# Patient Record
Sex: FEMALE | Race: BLACK OR AFRICAN AMERICAN | NOT HISPANIC OR LATINO | URBAN - METROPOLITAN AREA
[De-identification: names, ages, dates, MRNs, and addresses within clinical notes are randomized per-mention and may not be internally consistent; named-entity substitution may affect disease eponyms.]

---

## 2017-03-17 ENCOUNTER — EMERGENCY (EMERGENCY)
Facility: HOSPITAL | Age: 47
LOS: 1 days | Discharge: HOME | End: 2017-03-17
Attending: EMERGENCY MEDICINE
Payer: SELF-PAY

## 2017-03-17 VITALS
OXYGEN SATURATION: 98 % | RESPIRATION RATE: 16 BRPM | TEMPERATURE: 98 F | DIASTOLIC BLOOD PRESSURE: 78 MMHG | HEART RATE: 98 BPM | SYSTOLIC BLOOD PRESSURE: 125 MMHG | WEIGHT: 141.98 LBS | HEIGHT: 66 IN

## 2017-03-17 DIAGNOSIS — Z98.890 OTHER SPECIFIED POSTPROCEDURAL STATES: Chronic | ICD-10-CM
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
## 2017-03-17 DIAGNOSIS — M54.5 LOW BACK PAIN: ICD-10-CM

## 2017-03-17 DIAGNOSIS — S53.401A UNSPECIFIED SPRAIN OF RIGHT ELBOW, INITIAL ENCOUNTER: ICD-10-CM

## 2017-03-17 DIAGNOSIS — S63.501A UNSPECIFIED SPRAIN OF RIGHT WRIST, INITIAL ENCOUNTER: ICD-10-CM

## 2017-03-17 PROCEDURE — 73110 X-RAY EXAM OF WRIST: CPT | Mod: 26,RT

## 2017-03-17 PROCEDURE — 73080 X-RAY EXAM OF ELBOW: CPT | Mod: 26,76,LT

## 2017-03-17 PROCEDURE — 99284 EMERGENCY DEPT VISIT MOD MDM: CPT

## 2017-03-17 PROCEDURE — 73130 X-RAY EXAM OF HAND: CPT | Mod: 26,RT

## 2017-03-17 PROCEDURE — 73130 X-RAY EXAM OF HAND: CPT

## 2017-03-17 PROCEDURE — 73080 X-RAY EXAM OF ELBOW: CPT

## 2017-03-17 PROCEDURE — 73110 X-RAY EXAM OF WRIST: CPT

## 2017-03-17 RX ORDER — IBUPROFEN 200 MG
600 TABLET ORAL ONCE
Refills: 0 | Status: COMPLETED | OUTPATIENT
Start: 2017-03-17 | End: 2017-03-17

## 2017-03-17 RX ORDER — IBUPROFEN 200 MG
1 TABLET ORAL
Qty: 20 | Refills: 0
Start: 2017-03-17 | End: 2017-03-22

## 2017-03-17 RX ADMIN — Medication 600 MILLIGRAM(S): at 20:32

## 2017-03-17 RX ADMIN — Medication 600 MILLIGRAM(S): at 21:05
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
## 2017-03-17 NOTE — ED PROVIDER NOTE - MEDICAL DECISION MAKING DETAILS
Pt presents with b/l elbow pain (worse with R elbow), R wrist pain, and lower back pain s/p fall on R side. Pt denies head injury. Normal physical exam. Pt has pain to R elbow, R wrist, and R hand; back with muscle tenderness. Plan for X-Ray to r/o fractures, Ibuprofen for pain, and reassess. Pt presents with b/l elbow pain (worse with R elbow), R wrist pain, and lower back pain s/p fall on R side. Pt denies head injury. Normal physical exam. Pt has pain to R elbow, R wrist, and R hand; back with muscle tenderness. Plan for X-Ray to r/o fractures, Ibuprofen for pain, and DC out with velcro preformed wrist splint.

## 2017-03-17 NOTE — ED PROVIDER NOTE - SKIN, MLM
Skin normal color for race, warm, dry and intact. No evidence of rash. No swelling, no bruising, no deformity.

## 2017-03-17 NOTE — ED ADULT NURSE NOTE - DISCHARGE TEACHING
Right wrist placed in soft wrist brace. Able to move all fingers of right hand. Patient discharged with medications, follow up instructions. Ambulatory with steady gait. Patient left with coworker. In no acute distress.

## 2017-03-17 NOTE — ED PROVIDER NOTE - DIAGNOSTIC INTERPRETATION
R hand, wrist, elbow with no fracture, small anterior fat pad  L elbow, no fracture, small anterior fat pad

## 2017-03-17 NOTE — ED PROVIDER NOTE - CARE PLAN
Principal Discharge DX:	Elbow sprain, right, initial encounter  Secondary Diagnosis:	Fall  Secondary Diagnosis:	Wrist sprain, right, initial encounter

## 2018-06-21 NOTE — ED ADULT TRIAGE NOTE - ACCOMPANIED BY
Physical Therapy  Chart reviewed. Pt currently visiting with daughter and reports she has been up multiple times today. She declined walking at this time and wishing for PT to follow up tomorrow. Will f/u as able/appropriate. Thanks!   Yee Mattson, PT EMT/paramedic

## 2018-08-07 ENCOUNTER — EMERGENCY (EMERGENCY)
Facility: HOSPITAL | Age: 48
LOS: 1 days | Discharge: HOME | End: 2018-08-07
Attending: EMERGENCY MEDICINE
Payer: COMMERCIAL

## 2018-08-07 VITALS
HEART RATE: 67 BPM | DIASTOLIC BLOOD PRESSURE: 77 MMHG | RESPIRATION RATE: 16 BRPM | SYSTOLIC BLOOD PRESSURE: 117 MMHG | TEMPERATURE: 98 F | OXYGEN SATURATION: 100 %

## 2018-08-07 VITALS
HEART RATE: 72 BPM | SYSTOLIC BLOOD PRESSURE: 116 MMHG | RESPIRATION RATE: 16 BRPM | TEMPERATURE: 98 F | DIASTOLIC BLOOD PRESSURE: 72 MMHG | OXYGEN SATURATION: 100 %

## 2018-08-07 DIAGNOSIS — Z98.890 OTHER SPECIFIED POSTPROCEDURAL STATES: Chronic | ICD-10-CM

## 2018-08-07 PROCEDURE — 99283 EMERGENCY DEPT VISIT LOW MDM: CPT

## 2018-08-07 RX ORDER — IBUPROFEN 200 MG
600 TABLET ORAL ONCE
Qty: 0 | Refills: 0 | Status: DISCONTINUED | OUTPATIENT
Start: 2018-08-07 | End: 2018-08-07

## 2018-08-07 RX ORDER — IBUPROFEN 200 MG
600 TABLET ORAL ONCE
Qty: 0 | Refills: 0 | Status: COMPLETED | OUTPATIENT
Start: 2018-08-07 | End: 2018-08-07

## 2018-08-07 RX ADMIN — Medication 600 MILLIGRAM(S): at 11:50

## 2018-08-07 RX ADMIN — Medication 600 MILLIGRAM(S): at 11:20

## 2018-08-07 NOTE — ED PROVIDER NOTE - PLAN OF CARE
You may use Tylenol 650mg every 8 hours or Motrin 600mg every 8 hours as needed for pain.     You may use warm or hot packs on the affected areas as well.     Return for worsening pain, weakness of the arms or legs or any other concerns.
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
## 2018-08-07 NOTE — ED PROVIDER NOTE - PHYSICAL EXAMINATION
Attending MD Barron: A & O x 3, GCS 15, NAD, HEENT WNL and no facial asymmetry; no midline spinal TTP; lungs CTAB with no chest wall trauma or TTP, heart with reg rhythm without murmur; abdomen soft NTND with no R/G; extremities with no edema/deformities; skin with no rashes, neuro exam non focal with no motor or sensory deficits and patient is moving all extremities spontaneously.

## 2018-08-07 NOTE — ED PROVIDER NOTE - CARE PLAN
Principal Discharge DX:	Neck pain  Secondary Diagnosis:	Back pain Principal Discharge DX:	Neck pain  Assessment and plan of treatment:	You may use Tylenol 650mg every 8 hours or Motrin 600mg every 8 hours as needed for pain.     You may use warm or hot packs on the affected areas as well.     Return for worsening pain, weakness of the arms or legs or any other concerns.  Secondary Diagnosis:	Back pain

## 2018-08-07 NOTE — ED ADULT NURSE NOTE - CHPI ED NUR SYMPTOMS NEG
no difficulty bearing weight/no fussiness/no disorientation/no dizziness/no laceration/no bruising/no headache/no acting out behaviors/no decreased eating/drinking/no loss of consciousness/no sleeping issues/no crying

## 2018-08-07 NOTE — ED ADULT NURSE NOTE - NSIMPLEMENTINTERV_GEN_ALL_ED
Implemented All Universal Safety Interventions:  [place] to call system. Call bell, personal items and telephone within reach. Instruct patient to call for assistance. Room bathroom lighting operational. Non-slip footwear when patient is off stretcher. Physically safe environment: no spills, clutter or unnecessary equipment. Stretcher in lowest position, wheels locked, appropriate side rails in place.

## 2018-10-19 ENCOUNTER — EMERGENCY (EMERGENCY)
Facility: HOSPITAL | Age: 48
LOS: 1 days | Discharge: HOME | End: 2018-10-19
Attending: EMERGENCY MEDICINE
Payer: COMMERCIAL

## 2018-10-19 VITALS
WEIGHT: 139.99 LBS | HEART RATE: 70 BPM | RESPIRATION RATE: 18 BRPM | HEIGHT: 66 IN | SYSTOLIC BLOOD PRESSURE: 115 MMHG | TEMPERATURE: 98 F | OXYGEN SATURATION: 100 % | DIASTOLIC BLOOD PRESSURE: 76 MMHG

## 2018-10-19 VITALS
OXYGEN SATURATION: 100 % | RESPIRATION RATE: 16 BRPM | DIASTOLIC BLOOD PRESSURE: 79 MMHG | SYSTOLIC BLOOD PRESSURE: 118 MMHG | HEART RATE: 72 BPM | TEMPERATURE: 98 F

## 2018-10-19 DIAGNOSIS — Z98.890 OTHER SPECIFIED POSTPROCEDURAL STATES: Chronic | ICD-10-CM

## 2018-10-19 PROCEDURE — 99283 EMERGENCY DEPT VISIT LOW MDM: CPT | Mod: 25

## 2018-10-19 PROCEDURE — 99283 EMERGENCY DEPT VISIT LOW MDM: CPT

## 2018-10-19 PROCEDURE — 99053 MED SERV 10PM-8AM 24 HR FAC: CPT

## 2018-10-19 RX ORDER — IBUPROFEN 200 MG
600 TABLET ORAL ONCE
Qty: 0 | Refills: 0 | Status: COMPLETED | OUTPATIENT
Start: 2018-10-19 | End: 2018-10-19

## 2018-10-19 RX ADMIN — Medication 600 MILLIGRAM(S): at 03:07

## 2018-10-19 NOTE — ED PROVIDER NOTE - PHYSICAL EXAMINATION
GENERAL: NAD  HEAD:  NCAT  EYES: EOMI, PERRLA, conjunctiva and sclera clear  ENMT: mmm  NECK: Supple  CHEST/LUNG: CTAB  HEART: Regular rate and rhythm; No murmurs, rubs, or gallops  ABDOMEN: Soft, Nontender, Nondistended; Bowel sounds present  EXTREMITIES:  2+ Peripheral Pulses  LYMPH: No lymphadenopathy noted  SKIN: No rashes or lesions  NERVOUS SYSTEM:  Alert & Oriented X3, Good concentration; Motor Strength 5/5 B/L upper and lower extremities
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
## 2018-10-19 NOTE — ED ADULT NURSE NOTE - NSIMPLEMENTINTERV_GEN_ALL_ED
Implemented All Universal Safety Interventions:  [place] to call system. Call bell, personal items and telephone within reach. Instruct patient to call for assistance. Room bathroom lighting operational. Non-slip footwear when patient is off stretcher. Physically safe environment: no spills, clutter or unnecessary equipment. Stretcher in lowest position, wheels locked, appropriate side rails in place.
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
## 2018-10-19 NOTE — ED ADULT NURSE NOTE - CHPI ED NUR SYMPTOMS NEG
no difficulty bearing weight/no neck tenderness/no disorientation/no dizziness/no decreased eating/drinking/no acting out behaviors/no bruising/no crying/no headache/no loss of consciousness/no sleeping issues/no laceration
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
## 2018-10-19 NOTE — ED PROVIDER NOTE - NS ED ROS FT
CONSTITUTIONAL: No weakness, fevers or chills  EYES/ENT: No visual changes;  No vertigo or throat pain   NECK: No pain or stiffness  RESPIRATORY: No cough, wheezing, hemoptysis; No shortness of breath  CARDIOVASCULAR: No chest pain or palpitations  GASTROINTESTINAL: No abdominal or epigastric pain. No nausea, vomiting, or hematemesis; No diarrhea or constipation. No melena or hematochezia.  GENITOURINARY: No dysuria, frequency or hematuria  NEUROLOGICAL: No numbness or weakness  MSK: + L lower back pain above L buttock cheek  SKIN: No itching, burning, rashes, or lesions   All other review of systems is negative unless indicated above.

## 2018-10-19 NOTE — ED PROVIDER NOTE - NSFOLLOWUPINSTRUCTIONS_ED_ALL_ED_FT
Please take motrin as needed per label instructions.  You can also get an over-the-counter lidocaine patches, which can be used once per day.  Please follow up with your primary care provider as needed.

## 2019-06-07 ENCOUNTER — EMERGENCY (EMERGENCY)
Facility: HOSPITAL | Age: 49
LOS: 1 days | End: 2019-06-07
Attending: EMERGENCY MEDICINE
Payer: COMMERCIAL

## 2019-06-07 VITALS
DIASTOLIC BLOOD PRESSURE: 80 MMHG | HEART RATE: 67 BPM | RESPIRATION RATE: 17 BRPM | SYSTOLIC BLOOD PRESSURE: 124 MMHG | TEMPERATURE: 98 F | OXYGEN SATURATION: 100 %

## 2019-06-07 DIAGNOSIS — Z98.890 OTHER SPECIFIED POSTPROCEDURAL STATES: Chronic | ICD-10-CM

## 2019-06-07 PROCEDURE — 99284 EMERGENCY DEPT VISIT MOD MDM: CPT

## 2019-06-07 PROCEDURE — 99283 EMERGENCY DEPT VISIT LOW MDM: CPT

## 2019-06-07 PROCEDURE — 73060 X-RAY EXAM OF HUMERUS: CPT | Mod: 26,RT

## 2019-06-07 PROCEDURE — 73030 X-RAY EXAM OF SHOULDER: CPT

## 2019-06-07 PROCEDURE — 73060 X-RAY EXAM OF HUMERUS: CPT

## 2019-06-07 PROCEDURE — 73030 X-RAY EXAM OF SHOULDER: CPT | Mod: 26,RT

## 2019-06-07 RX ORDER — IBUPROFEN 200 MG
600 TABLET ORAL ONCE
Refills: 0 | Status: COMPLETED | OUTPATIENT
Start: 2019-06-07 | End: 2019-06-07

## 2019-06-07 RX ORDER — ACETAMINOPHEN 500 MG
650 TABLET ORAL ONCE
Refills: 0 | Status: COMPLETED | OUTPATIENT
Start: 2019-06-07 | End: 2019-06-07

## 2019-06-07 RX ADMIN — Medication 600 MILLIGRAM(S): at 12:55

## 2019-06-07 RX ADMIN — Medication 650 MILLIGRAM(S): at 12:55
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
## 2019-06-07 NOTE — ED PROVIDER NOTE - ATTENDING CONTRIBUTION TO CARE
48 y/o female with the above documented history and HPI who on exam appears well. VSs noted, head NC/AT, EOMsI, neck supple, breathing c/ ease, chest wall and clavicle s/ ttp, back s/ scapula ttp, extremities s/ asymmetry c/ LROM of R shoulder in nearly all directions secondary to pain s/ erythema, edema, ecchymosis, warmth or bony point ttp, skin s/ ecchymosis or laceration and neurologically completely intact. Appropriate screening studies obtained. Analgesia provided. Will follow her studies and treat/dispo accordingly.

## 2019-06-07 NOTE — ED PROVIDER NOTE - PHYSICAL EXAMINATION
Gen: NAD, AOx3  Head: NCAT  HEENT: PERRL, oral mucosa moist, normal conjunctiva  Lung: CTAB, no respiratory distress  CV: rrr, no murmurs, Normal perfusion  MSK: No edema, no visible deformities, no TTP of right shoulder.  Pain with flexion, extension, and abduction of right shoulder past about 60 degrees.  +Radial pulses bilaterally.   Neuro: No focal neurologic deficits, equal  strength and equal sensation bilateral upper extremities  Skin: No rash   Psych: normal affect

## 2019-06-07 NOTE — ED ADULT NURSE NOTE - NSIMPLEMENTINTERV_GEN_ALL_ED
Implemented All Universal Safety Interventions:  [place] to call system. Call bell, personal items and telephone within reach. Instruct patient to call for assistance. Room bathroom lighting operational. Non-slip footwear when patient is off stretcher. Physically safe environment: no spills, clutter or unnecessary equipment. Stretcher in lowest position, wheels locked, appropriate side rails in place.

## 2019-06-07 NOTE — ED PROVIDER NOTE - NSFOLLOWUPINSTRUCTIONS_ED_ALL_ED_FT
- Rest, ice, and elevate the extremity.   - Use the sling for comfort but remember to allow your should time out of the sling to perform gentle rang of motion exercises as discussed in the ER.   - You may take Aleve for pain as directed  - Please call to schedule an appointment with an orthopedist or sports medicine doctor for follow up - contact list attached

## 2022-04-13 ENCOUNTER — APPOINTMENT (OUTPATIENT)
Dept: NEUROSURGERY | Facility: CLINIC | Age: 52
End: 2022-04-13
Payer: COMMERCIAL

## 2022-04-13 VITALS
HEART RATE: 75 BPM | TEMPERATURE: 98.2 F | DIASTOLIC BLOOD PRESSURE: 78 MMHG | SYSTOLIC BLOOD PRESSURE: 116 MMHG | HEIGHT: 66 IN | OXYGEN SATURATION: 100 % | BODY MASS INDEX: 22.5 KG/M2 | WEIGHT: 140 LBS

## 2022-04-13 DIAGNOSIS — Z82.3 FAMILY HISTORY OF STROKE: ICD-10-CM

## 2022-04-13 PROCEDURE — 99204 OFFICE O/P NEW MOD 45 MIN: CPT

## 2022-04-13 NOTE — PHYSICAL EXAM
[Person] : oriented to person [Place] : oriented to place [Time] : oriented to time [Cranial Nerves Optic (II)] : visual acuity intact bilaterally,  pupils equal round and reactive to light [Cranial Nerves Oculomotor (III)] : extraocular motion intact [Cranial Nerves Trigeminal (V)] : facial sensation intact symmetrically [Cranial Nerves Vestibulocochlear (VIII)] : hearing was intact bilaterally [Cranial Nerves Facial (VII)] : face symmetrical [Cranial Nerves Glossopharyngeal (IX)] : tongue and palate midline [Cranial Nerves Accessory (XI - Cranial And Spinal)] : head turning and shoulder shrug symmetric [Cranial Nerves Hypoglossal (XII)] : there was no tongue deviation with protrusion [Motor Tone] : muscle tone was normal in all four extremities [Motor Strength] : muscle strength was normal in all four extremities [Abnormal Walk] : normal gait [Balance] : balance was intact

## 2022-04-13 NOTE — HISTORY OF PRESENT ILLNESS
[de-identified] : Milagros Carlin is a 51 year old female with no significant PMH who has been having on and off headaches in the past, saw neurologist Dr. Ping Hutchinson who ordered imaging and found to have left frontal lobe 1.2 x 2.3 cm AVM. She feels overall well, besides her occasional headaches she denies other symptoms of motor, sensory, speech, or visual abnormalities. \par \par PCP: Dr. Lexa El\par Neurologist: Dr. Ping Hutchinson

## 2022-04-13 NOTE — ASSESSMENT
[FreeTextEntry1] : Impression: 51F with no significant PMH p/w occasional headaches, MRI ordered by neurologist Dr. 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 Mother had history of stroke.\par \par Patient has mild headaches on and off that resolve on their own. Denies other symptoms of motor, sensory, speech, or visual abnormalities. \par Counseled patient on the natural history of AVMs. Discussed risk of AVM rupture (2.4% per year) and signs and symptoms of strokes, or seizures. Risk factors for AVM rupture include smoking, uncontrolled blood pressure.\par \par Recommend diagnostic cerebral angiogram as the initial step to evaluate characteristics of AVM. The risks, benefits, alternatives, complications and personnel associated with the procedure were discussed with the patient in great detail. She requests that we proceed. \par \par Treatment modality options depend on location of AVM, size of nidus, where the veins drain, and/or aneurysms associated with AVMs. These options include endovascular intervention with embolization, open surgical resection of AVM, and/or radiosurgery. WIll reevaluate treatment plan after diagnostic angiogram. \par \par \par Plan:\par Diagnostic cerebral angiogram 4/21\par Functional MRI brain at [facility] as AVM is located near speech areas of brain\par Present patient's case in [facility] academic conference\par

## 2022-04-19 ENCOUNTER — OUTPATIENT (OUTPATIENT)
Dept: OUTPATIENT SERVICES | Facility: HOSPITAL | Age: 52
LOS: 1 days | End: 2022-04-19
Payer: COMMERCIAL

## 2022-04-19 VITALS
WEIGHT: 139.99 LBS | SYSTOLIC BLOOD PRESSURE: 108 MMHG | OXYGEN SATURATION: 99 % | HEART RATE: 69 BPM | HEIGHT: 66.5 IN | TEMPERATURE: 99 F | RESPIRATION RATE: 18 BRPM | DIASTOLIC BLOOD PRESSURE: 74 MMHG

## 2022-04-19 DIAGNOSIS — Q28.2 ARTERIOVENOUS MALFORMATION OF CEREBRAL VESSELS: ICD-10-CM

## 2022-04-19 DIAGNOSIS — I67.1 CEREBRAL ANEURYSM, NONRUPTURED: ICD-10-CM

## 2022-04-19 DIAGNOSIS — Z98.890 OTHER SPECIFIED POSTPROCEDURAL STATES: Chronic | ICD-10-CM

## 2022-04-19 DIAGNOSIS — Q27.30 ARTERIOVENOUS MALFORMATION, SITE UNSPECIFIED: ICD-10-CM

## 2022-04-19 DIAGNOSIS — Z11.52 ENCOUNTER FOR SCREENING FOR COVID-19: ICD-10-CM

## 2022-04-19 DIAGNOSIS — Z01.818 ENCOUNTER FOR OTHER PREPROCEDURAL EXAMINATION: ICD-10-CM

## 2022-04-19 LAB
ANION GAP SERPL CALC-SCNC: 15 MMOL/L — SIGNIFICANT CHANGE UP (ref 5–17)
BLD GP AB SCN SERPL QL: NEGATIVE — SIGNIFICANT CHANGE UP
BUN SERPL-MCNC: 11 MG/DL — SIGNIFICANT CHANGE UP (ref 7–23)
CALCIUM SERPL-MCNC: 9.1 MG/DL — SIGNIFICANT CHANGE UP (ref 8.4–10.5)
CHLORIDE SERPL-SCNC: 105 MMOL/L — SIGNIFICANT CHANGE UP (ref 96–108)
CO2 SERPL-SCNC: 21 MMOL/L — LOW (ref 22–31)
CREAT SERPL-MCNC: 1.18 MG/DL — SIGNIFICANT CHANGE UP (ref 0.5–1.3)
EGFR: 56 ML/MIN/1.73M2 — LOW
GLUCOSE SERPL-MCNC: 87 MG/DL — SIGNIFICANT CHANGE UP (ref 70–99)
HCT VFR BLD CALC: 35.7 % — SIGNIFICANT CHANGE UP (ref 34.5–45)
HGB BLD-MCNC: 11 G/DL — LOW (ref 11.5–15.5)
MCHC RBC-ENTMCNC: 22.7 PG — LOW (ref 27–34)
MCHC RBC-ENTMCNC: 30.8 GM/DL — LOW (ref 32–36)
MCV RBC AUTO: 73.8 FL — LOW (ref 80–100)
NRBC # BLD: 0 /100 WBCS — SIGNIFICANT CHANGE UP (ref 0–0)
PLATELET # BLD AUTO: 278 K/UL — SIGNIFICANT CHANGE UP (ref 150–400)
POTASSIUM SERPL-MCNC: 3.9 MMOL/L — SIGNIFICANT CHANGE UP (ref 3.5–5.3)
POTASSIUM SERPL-SCNC: 3.9 MMOL/L — SIGNIFICANT CHANGE UP (ref 3.5–5.3)
RBC # BLD: 4.84 M/UL — SIGNIFICANT CHANGE UP (ref 3.8–5.2)
RBC # FLD: 16 % — HIGH (ref 10.3–14.5)
RH IG SCN BLD-IMP: NEGATIVE — SIGNIFICANT CHANGE UP
SARS-COV-2 RNA SPEC QL NAA+PROBE: SIGNIFICANT CHANGE UP
SODIUM SERPL-SCNC: 141 MMOL/L — SIGNIFICANT CHANGE UP (ref 135–145)
WBC # BLD: 8 K/UL — SIGNIFICANT CHANGE UP (ref 3.8–10.5)
WBC # FLD AUTO: 8 K/UL — SIGNIFICANT CHANGE UP (ref 3.8–10.5)

## 2022-04-19 PROCEDURE — U0005: CPT

## 2022-04-19 PROCEDURE — 80048 BASIC METABOLIC PNL TOTAL CA: CPT

## 2022-04-19 PROCEDURE — U0003: CPT

## 2022-04-19 PROCEDURE — 86900 BLOOD TYPING SEROLOGIC ABO: CPT

## 2022-04-19 PROCEDURE — 86901 BLOOD TYPING SEROLOGIC RH(D): CPT

## 2022-04-19 PROCEDURE — C9803: CPT

## 2022-04-19 PROCEDURE — G0463: CPT

## 2022-04-19 PROCEDURE — 85027 COMPLETE CBC AUTOMATED: CPT

## 2022-04-19 PROCEDURE — 86850 RBC ANTIBODY SCREEN: CPT

## 2022-04-19 NOTE — H&P PST ADULT - NSICDXFAMILYHX_GEN_ALL_CORE_FT
FAMILY HISTORY:  FH: cerebral aneurysm, maternal cousin, age 46    Father  Still living? Unknown  FH: asthma, Age at diagnosis: Age Unknown    Mother  Still living? Unknown  FH: dementia, Age at diagnosis: Age Unknown  FH: stroke, Age at diagnosis: Age Unknown

## 2022-04-19 NOTE — H&P PST ADULT - NSICDXPASTSURGICALHX_GEN_ALL_CORE_FT
PAST SURGICAL HISTORY:  H/O colonoscopy     H/O hand surgery right hand x 2: ligament tear repair (2014) and tendon repair (2014)    H/O ovarian cystectomy left

## 2022-04-19 NOTE — H&P PST ADULT - HISTORY OF PRESENT ILLNESS
50 yo female     Covid19+ on 1/13/22 -  headache/fatigue/fever/cough x 2 days. Had bronchitis; symptoms resolved 2/7/22.   Covid19 vaccination series completed.  Covid19 PCR completed at [facility] today. 50 yo female presents to PST prior to scheduled cerebral angiogram on 4/21/22 with Dr. Martinez. Pmhx includes asthmatic bronchitis (very rare inhaler use; only when has URI), Covid19+ on 1/13/22 -  headache/fatigue/fever/cough, symptoms completely resolved after 2 weeks of onset, uterine fibroids, cervical arthritis. C/o of intermittent headaches x 3 months. Denies imbalance, seizures, fevers, chills, chest pain, palpitations, SOB/COVINGTON, vertigo, syncope. Recently had imaging of the brain which revealed "AVM". Evaluated by Dr. Martinez and above procedure was recommended.      Covid19 vaccination series completed.  Covid19 PCR completed at [facility] today. 52 yo female presents to PST prior to scheduled cerebral angiogram on 4/21/22 with Dr. Martinez. Pmhx includes asthmatic bronchitis (very rare inhaler use; only when has URI), Covid19+ on 1/13/22 -  headache/fatigue/fever/cough, symptoms completely resolved after 2 weeks of onset, uterine fibroids, cervical arthritis. C/o of intermittent headaches x 3 months. Denies imbalance, speech abnormality, dysphagia, seizures, fevers, chills, chest pain, palpitations, SOB/COVINGTON, vertigo, syncope. Recently had imaging of the brain which revealed "AVM". Evaluated by Dr. Martinez and above procedure was recommended.      Covid19 vaccination series completed.  Covid19 PCR completed at [facility] today.

## 2022-04-19 NOTE — H&P PST ADULT - NSICDXPASTMEDICALHX_GEN_ALL_CORE_FT
PAST MEDICAL HISTORY:  2019 novel coronavirus disease (COVID-19) 1/2022    Asthmatic bronchitis triggered by URIs; never intubated or hospitalized    Uterine fibroid

## 2022-04-19 NOTE — H&P PST ADULT - NSANTHOSAYNRD_GEN_A_CORE
No. MAURA screening performed.  STOP BANG Legend: 0-2 = LOW Risk; 3-4 = INTERMEDIATE Risk; 5-8 = HIGH Risk

## 2022-04-19 NOTE — H&P PST ADULT - PROBLEM SELECTOR PLAN 1
Cerebral angiogram on 4/21/22 with Dr. Martinez.  Pre-op instructions given. Questions answered.  Covid19 PCR at [facility] performed today.

## 2022-04-19 NOTE — H&P PST ADULT - BP NONINVASIVE DIASTOLIC (MM HG)
Vascular Surgery Vascular Surgery Vascular Surgery Vascular Surgery Hospitalist Hospitalist Hospitalist Vascular Surgery Vascular Surgery Hospitalist 74

## 2022-04-21 ENCOUNTER — TRANSCRIPTION ENCOUNTER (OUTPATIENT)
Age: 52
End: 2022-04-21

## 2022-04-21 ENCOUNTER — APPOINTMENT (OUTPATIENT)
Dept: NEUROSURGERY | Facility: HOSPITAL | Age: 52
End: 2022-04-21

## 2022-04-21 ENCOUNTER — OUTPATIENT (OUTPATIENT)
Dept: OUTPATIENT SERVICES | Facility: HOSPITAL | Age: 52
LOS: 1 days | End: 2022-04-21
Payer: COMMERCIAL

## 2022-04-21 VITALS
DIASTOLIC BLOOD PRESSURE: 76 MMHG | SYSTOLIC BLOOD PRESSURE: 115 MMHG | RESPIRATION RATE: 15 BRPM | OXYGEN SATURATION: 100 % | HEART RATE: 70 BPM

## 2022-04-21 VITALS
HEIGHT: 66 IN | DIASTOLIC BLOOD PRESSURE: 75 MMHG | OXYGEN SATURATION: 100 % | RESPIRATION RATE: 18 BRPM | TEMPERATURE: 99 F | HEART RATE: 83 BPM | WEIGHT: 139.99 LBS | SYSTOLIC BLOOD PRESSURE: 119 MMHG

## 2022-04-21 DIAGNOSIS — Z98.890 OTHER SPECIFIED POSTPROCEDURAL STATES: Chronic | ICD-10-CM

## 2022-04-21 DIAGNOSIS — Q28.2 ARTERIOVENOUS MALFORMATION OF CEREBRAL VESSELS: ICD-10-CM

## 2022-04-21 PROCEDURE — 36226 PLACE CATH VERTEBRAL ART: CPT | Mod: RT

## 2022-04-21 PROCEDURE — 36227 PLACE CATH XTRNL CAROTID: CPT

## 2022-04-21 PROCEDURE — C1769: CPT

## 2022-04-21 PROCEDURE — 36226 PLACE CATH VERTEBRAL ART: CPT

## 2022-04-21 PROCEDURE — C1887: CPT

## 2022-04-21 PROCEDURE — 76377 3D RENDER W/INTRP POSTPROCES: CPT | Mod: 26

## 2022-04-21 PROCEDURE — 36224 PLACE CATH CAROTD ART: CPT

## 2022-04-21 PROCEDURE — 36224 PLACE CATH CAROTD ART: CPT | Mod: 50

## 2022-04-21 PROCEDURE — C1894: CPT

## 2022-04-21 RX ORDER — MONTELUKAST 4 MG/1
1 TABLET, CHEWABLE ORAL
Qty: 0 | Refills: 0 | DISCHARGE

## 2022-04-21 RX ORDER — ALBUTEROL 90 UG/1
2 AEROSOL, METERED ORAL
Qty: 0 | Refills: 0 | DISCHARGE

## 2022-04-21 NOTE — CHART NOTE - NSCHARTNOTEFT_GEN_A_CORE
Interventional Neuro Radiology  Pre-Procedure Note    This is a 52 yo female presents with Pmhx includes asthmatic bronchitis (very rare inhaler use; only when has URI), Covid19+ on 22 -  headache/fatigue/fever/cough, symptoms completely resolved after 2 weeks of onset, uterine fibroids, cervical arthritis. Patient c/o of intermittent headaches x 3 months. Denies imbalance, speech abnormality, dysphagia, seizures, fevers, chills, chest pain, palpitations, SOB/COVINGTON, vertigo, syncope. Recently had imaging of the brain which revealed "AVM". Patient presents today to neuro IR for selective cerebral angiography.       Neuro Exam: Awake and alert, oriented x3, fluent, normal naming and repetition, follows 3 step commands. Extraocular movements intact, no nystagmus, visual fields full, face symmetric, tongue midline. No drift, 5/5 power x 4 extremities. Normal sensation to LT. Normal finger-to-nose and rapid alternating movements.    PAST MEDICAL & SURGICAL HISTORY:  2019 novel coronavirus disease (COVID-19) 2022  Asthmatic bronchitis triggered by URIs; never intubated or hospitalized  Uterine fibroid  H/O ovarian cystectomy left  H/O hand surgery right hand x 2: ligament tear repair () and tendon repair (2014)  H/O colonoscopy    Social History:   Denies tobacco use    FAMILY HISTORY:  FH: dementia (Mother) age 89  FH: asthma (Father)  age 62  FH: stroke (Mother)  FH: cerebral aneurysm maternal cousin, age 46    Allergies:   dust (Rhinitis)  No Known Drug Allergies  Seasonal (Rhinitis)  shellfish (Angioedema)      Current Medications:   · 	montelukast 4 mg oral granule: Last Dose Taken:  , 1 each orally once a day  · 	Albuterol (Eqv-ProAir HFA) 90 mcg/inh inhalation aerosol: Last Dose Taken:  , 2 puff(s) inhaled every 6 hours, As Needed    Labs:                         11.0   8.00  )-----------( 278      ( 2022 20:05 )             35.7           141  |  105  |  11  ----------------------------<  87  3.9   |  21<L>  |  1.18    Ca    9.1      2022 20:05          HCG: negative     Blood Bank: 22  A  --  Negative      Assessment/Plan:   This is a 52yo female  presents with AVM. Patient presents to neuro-IR for selective cerebral angiography. Procedure/ risks/ benefits/ goals/ alternatives were explained. Risks include but are not limited to stroke/ vessel injury/ hemorrhage/ groin hematoma. All questions answered. Informed content obtained from patient. Consent placed in chart.    Siri Joseph PA-C  [phone]

## 2022-04-21 NOTE — ASU DISCHARGE PLAN (ADULT/PEDIATRIC) - CARE PROVIDER_API CALL
Roger Martinez (MD; MS)  Unallocated  [address] [facility], [address] [place] [id number]  Phone: [phone]  Fax: [phone]  Follow Up Time:

## 2022-04-21 NOTE — ASU DISCHARGE PLAN (ADULT/PEDIATRIC) - NS MD DC FALL RISK RISK
For information on Fall & Injury Prevention, visit: [email or web address] OR  [email or web address] OR  https://www.cdc.gov/steadi/patient.html

## 2022-04-21 NOTE — CHART NOTE - NSCHARTNOTEFT_GEN_A_CORE
Interventional Neuro- Radiology   Procedure Note    Procedure: Selective Cerebral Angiography   Pre- Procedure Diagnosis: AVM   Post- Procedure Diagnosis:    : Dr. Juan MD  Fellow: Dr. Waldron  Physician Assistant: Siri Joseph PA-C    RN: Piotr   Tech: Herrera     Anesthesia: Dr. Hilda MD (MAC)     I/Os:  Fluids:  Neff: DTV   Contrast:  Estimated Blood Loss: <10cc      Preliminary Report:  Under MAC, using a ___Fr short sheath to the right groin/ right radial artery examination of left vertebral artery/ left internal carotid artery/ left external carotid artery/ right vertebral artery/ right internal carotid artery/ right external carotid artery via selective cerebral angiography demonstrates ________. ( Official note to follow).    Patient tolerated procedure well, vital signs stable, hemodynamically stable, no change in neurological status compared to baseline. Results discussed with patient and their family. Groin sheath d/c'ed, manual compression held to hemostasis, no active bleeding, no hematoma, Vascade applied, quick clot and safeguard balloon dressing applied at _____h.  Patient transferred to IR recovery for further care/ monitoring. Interventional Neuro- Radiology   Procedure Note    Procedure: Selective Cerebral Angiography   Pre- Procedure Diagnosis: AVM   Post- Procedure Diagnosis: left frontal AVM     : Dr. Juan MD  Fellow: Dr. Waldron  Physician Assistant: Siri Joseph PA-C    RN: Piotr   Tech: Herrera     Anesthesia: Dr. Hilda MD (MAC)     I/Os:  Fluids: 400cc   Neff: DTV   Contrast: 110cc   Estimated Blood Loss: <10cc      Preliminary Report:  Under MAC, using a 5/4Fr short sheath to the right radial artery examination of left internal carotid artery/ left external carotid artery/ right vertebral artery/ right common carotid artery via selective cerebral angiography demonstrates . ( Official note to follow).    Patient tolerated procedure well, vital signs stable, hemodynamically stable, no change in neurological status compared to baseline. Results discussed with patient and their family. Radial sheath d/c'ed, no active bleeding, no hematoma, TR band applied at 1205h with 12cc of air .  Patient transferred to IR recovery for further care/ monitoring.    Siri Joseph PA-C  [phone]

## 2022-04-21 NOTE — ASU DISCHARGE PLAN (ADULT/PEDIATRIC) - NURSING INSTRUCTIONS
Please feel free to contact us at [phone] if any problems arise. After 6PM, Monday through Friday, on weekends and on holidays, please call [phone] and ask for the radiology resident on call to be paged.

## 2022-04-21 NOTE — ASU PATIENT PROFILE, ADULT - FALL HARM RISK - HARM RISK INTERVENTIONS

## 2022-04-28 DIAGNOSIS — Q28.2 ARTERIOVENOUS MALFORMATION OF CEREBRAL VESSELS: ICD-10-CM

## 2022-05-02 PROBLEM — J45.909 UNSPECIFIED ASTHMA, UNCOMPLICATED: Chronic | Status: ACTIVE | Noted: 2022-04-19

## 2022-05-02 PROBLEM — D25.9 LEIOMYOMA OF UTERUS, UNSPECIFIED: Chronic | Status: ACTIVE | Noted: 2022-04-19

## 2022-05-02 PROBLEM — U07.1 COVID-19: Chronic | Status: ACTIVE | Noted: 2022-04-19

## 2022-05-03 ENCOUNTER — APPOINTMENT (OUTPATIENT)
Dept: MRI IMAGING | Facility: HOSPITAL | Age: 52
End: 2022-05-03

## 2022-05-03 ENCOUNTER — OUTPATIENT (OUTPATIENT)
Dept: OUTPATIENT SERVICES | Facility: HOSPITAL | Age: 52
LOS: 1 days | End: 2022-05-03
Payer: COMMERCIAL

## 2022-05-03 DIAGNOSIS — Z98.890 OTHER SPECIFIED POSTPROCEDURAL STATES: Chronic | ICD-10-CM

## 2022-05-03 DIAGNOSIS — Q28.2 ARTERIOVENOUS MALFORMATION OF CEREBRAL VESSELS: ICD-10-CM

## 2022-05-03 PROCEDURE — 70555 FMRI BRAIN BY PHYS/PSYCH: CPT | Mod: 26

## 2022-05-03 PROCEDURE — 70555 FMRI BRAIN BY PHYS/PSYCH: CPT

## 2022-05-11 ENCOUNTER — APPOINTMENT (OUTPATIENT)
Dept: NEUROSURGERY | Facility: CLINIC | Age: 52
End: 2022-05-11
Payer: COMMERCIAL

## 2022-05-11 VITALS
TEMPERATURE: 98.4 F | OXYGEN SATURATION: 99 % | HEIGHT: 66 IN | BODY MASS INDEX: 22.5 KG/M2 | SYSTOLIC BLOOD PRESSURE: 116 MMHG | DIASTOLIC BLOOD PRESSURE: 74 MMHG | HEART RATE: 70 BPM | WEIGHT: 140 LBS

## 2022-05-11 PROCEDURE — 99214 OFFICE O/P EST MOD 30 MIN: CPT

## 2022-05-11 NOTE — HISTORY OF PRESENT ILLNESS
[FreeTextEntry1] : Milagros Carlin is a 52 year old female with no significant PMH who has been having on and off headaches in the past, saw neurologist Dr. Ping Hutchinson who ordered imaging and found to have left frontal lobe 1.2 x 2.3 cm AVM. She initially presented with occasional headaches. 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 On 5/3, she had a functional MRI brain done.\par \par Today, she continues to do well overall. Denies symptoms of motor, sensory, speech, or visual abnormalities.

## 2022-05-11 NOTE — ASSESSMENT
[FreeTextEntry1] : Impression: \par 52F with no significant PMH p/w occasional headaches, MRI ordered by neurologist Dr. Ping Hutchinson, found to have left frontal lobe 1.2 x 2.3 cm arteriovenous malformation s/p dx angio on 4/21 confirming Spetzler Eric grade 2 AVM within L frontal lobe, with arterial feeders arising from the anterior division of the left middle cerebral artery, and venous outflow predominantly inferiorly and posteriorly into the left sigmoid sinus, and to a lesser extent into the anterior superior sagittal sinus. Functional MRI brain done 5/3, speech tasks demonstrate preferential activation of the left frontal lobe although there is some right frontal activation with object naming. The activated speech areas appear superior and posterior to the AVM nidus. \par \par Patient continues to do clinically well overall. Denies symptoms of motor, sensory, speech, or visual abnormalities. \par Discussed the risks, benefits, complications of all treatment modality options including endovascular intervention with embolization, open surgical resection of AVM, and/or stereotactic radiosurgery with gamma knife. \par Favorable functional MRI shows if plan to treat AVM surgically, will be relatively safe with some risk of affect on language but minimal. Surgical resection of AVM is the more definitive treatment.\par \par Gamma knife consists of usually one time treatment with sufficient high focused one time dose, with minimizing dose to surrounding area. Takes time to work, shutting down the AVM over time. Given the small size and compact nidus, we estimate effectiveness would be around 80%. Risks of gamma knife include affecting speech areas surrounding the AVM, delayed rupture after treatment, and radiation necrosis. \par Risks of surgery/treatment for AVM outweigh the risks of leaving AVM alone and risk of AVM rupture/bleeding.  \par \par \par Plan:\par Patient would like time to think which route she would like to proceed with\par

## 2022-05-24 ENCOUNTER — OUTPATIENT (OUTPATIENT)
Dept: OUTPATIENT SERVICES | Facility: HOSPITAL | Age: 52
LOS: 1 days | Discharge: HOME | End: 2022-05-24
Payer: COMMERCIAL

## 2022-05-24 ENCOUNTER — APPOINTMENT (OUTPATIENT)
Dept: RADIATION ONCOLOGY | Facility: CLINIC | Age: 52
End: 2022-05-24
Payer: COMMERCIAL

## 2022-05-24 DIAGNOSIS — Z87.09 PERSONAL HISTORY OF OTHER DISEASES OF THE RESPIRATORY SYSTEM: ICD-10-CM

## 2022-05-24 DIAGNOSIS — Z98.890 OTHER SPECIFIED POSTPROCEDURAL STATES: Chronic | ICD-10-CM

## 2022-05-24 DIAGNOSIS — Z78.9 OTHER SPECIFIED HEALTH STATUS: ICD-10-CM

## 2022-05-24 PROCEDURE — 99205 OFFICE O/P NEW HI 60 MIN: CPT | Mod: 25,95

## 2022-05-24 PROCEDURE — 77263 THER RADIOLOGY TX PLNG CPLX: CPT

## 2022-05-28 NOTE — REVIEW OF SYSTEMS
[Negative] : Psychiatric [Fever] : no fever [Chills] : no chills [Night Sweats] : no night sweats [Eye Pain] : no eye pain [Red Eyes] : eyes not red [Dysphagia] : no dysphagia [Loss of Hearing] : no loss of hearing [Nosebleeds] : no nosebleeds [Shortness Of Breath] : no shortness of breath [Cough] : no cough [Abdominal Pain] : no abdominal pain [Vomiting] : no vomiting [Confused] : no confusion [Dizziness] : no dizziness [Fainting] : no fainting [Difficulty Walking] : no difficulty walking [de-identified] : denies headaches

## 2022-05-28 NOTE — HISTORY OF PRESENT ILLNESS
[Home] : at home, [unfilled] , at the time of the visit. [Medical Office: ([facility])___] : at the medical office located in  [Verbal consent obtained from patient] : the patient, [unfilled] [FreeTextEntry1] : Ms. Carlin presents today for consideration for radiation therapy. She is seen today through [facility] for which she provides verbal consent on 3/21/2021 at 3:21 PM\par \par Ms. Carlin underwent a brain RMI 3/28/2022 for investigation of headaches in the context of her cousin having an aneurysm.\par \par Brain MRI done 3/28/2022 showed an AVM within the inferior gyrus of the left frontal lobe fed by the superior branch of the left MCA, 1.2 X 1.. 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 no longer having headaches. denies nausea, vomiting, focal weakness, numbness, tingling.

## 2022-05-28 NOTE — VITALS
[Maximal Pain Intensity: 0/10] : 0/10 [Least Pain Intensity: 0/10] : 0/10 [90: Able to carry normal activity; minor signs or symptoms of disease.] : 90: Able to carry normal activity; minor signs or symptoms of disease.  [Date: ____________] : Patient's last distress assessment performed on [unfilled]. [5 - Distress Level] : Distress Level: 5 [FreeTextEntry7] : social work offered patient refused

## 2022-06-03 ENCOUNTER — NON-APPOINTMENT (OUTPATIENT)
Age: 52
End: 2022-06-03

## 2022-06-30 ENCOUNTER — OUTPATIENT (OUTPATIENT)
Dept: OUTPATIENT SERVICES | Facility: HOSPITAL | Age: 52
LOS: 1 days | End: 2022-06-30
Payer: COMMERCIAL

## 2022-06-30 ENCOUNTER — APPOINTMENT (OUTPATIENT)
Dept: MRI IMAGING | Facility: IMAGING CENTER | Age: 52
End: 2022-06-30

## 2022-06-30 ENCOUNTER — APPOINTMENT (OUTPATIENT)
Dept: NEUROSURGERY | Facility: HOSPITAL | Age: 52
End: 2022-06-30

## 2022-06-30 DIAGNOSIS — Z98.890 OTHER SPECIFIED POSTPROCEDURAL STATES: Chronic | ICD-10-CM

## 2022-06-30 DIAGNOSIS — Q28.2 ARTERIOVENOUS MALFORMATION OF CEREBRAL VESSELS: ICD-10-CM

## 2022-06-30 PROCEDURE — 70546 MR ANGIOGRAPH HEAD W/O&W/DYE: CPT | Mod: 26

## 2022-06-30 PROCEDURE — 77300 RADIATION THERAPY DOSE PLAN: CPT | Mod: 26

## 2022-06-30 PROCEDURE — 76498 UNLISTED MR PROCEDURE: CPT

## 2022-06-30 PROCEDURE — 77295 3-D RADIOTHERAPY PLAN: CPT | Mod: 26

## 2022-06-30 PROCEDURE — 70546 MR ANGIOGRAPH HEAD W/O&W/DYE: CPT

## 2022-06-30 PROCEDURE — 61798 SRS CRANIAL LESION COMPLEX: CPT

## 2022-06-30 PROCEDURE — A9585: CPT

## 2022-06-30 PROCEDURE — 77432 STEREOTACTIC RADIATION TRMT: CPT

## 2022-06-30 PROCEDURE — 61800 APPLY SRS HEADFRAME ADD-ON: CPT

## 2022-06-30 RX ORDER — ACETAMINOPHEN 10 MG/ML
1000 INJECTION INTRAVENOUS
Qty: 0 | Refills: 0 | Status: COMPLETED | OUTPATIENT
Start: 2022-06-29

## 2022-06-30 RX ORDER — LORAZEPAM 0.5 MG/1
0.5 TABLET ORAL
Qty: 0 | Refills: 0 | Status: COMPLETED | OUTPATIENT
Start: 2022-06-29

## 2022-06-30 RX ORDER — IBUPROFEN 400 MG/1
400 TABLET, FILM COATED ORAL
Qty: 0 | Refills: 0 | Status: COMPLETED | OUTPATIENT
Start: 2022-06-29

## 2022-07-13 ENCOUNTER — APPOINTMENT (OUTPATIENT)
Dept: NEUROSURGERY | Facility: CLINIC | Age: 52
End: 2022-07-13

## 2022-07-13 VITALS
DIASTOLIC BLOOD PRESSURE: 76 MMHG | SYSTOLIC BLOOD PRESSURE: 128 MMHG | BODY MASS INDEX: 23.3 KG/M2 | OXYGEN SATURATION: 99 % | WEIGHT: 145 LBS | HEART RATE: 84 BPM | HEIGHT: 66 IN

## 2022-07-13 PROCEDURE — 99213 OFFICE O/P EST LOW 20 MIN: CPT

## 2022-07-13 NOTE — ASSESSMENT
[FreeTextEntry1] : Impression: \par 52F with no significant PMH p/w occasional headaches, MRI ordered by neurologist Dr. Ping Hutchinson, found to have left frontal lobe 1.2 x 2.3 cm arteriovenous malformation s/p dx angio on 4/21 confirming Spetzler Eric grade 2 AVM within L frontal lobe, with arterial feeders arising from the anterior division of the left middle cerebral artery, and venous outflow predominantly inferiorly and posteriorly into the left sigmoid sinus, and to a lesser extent into the anterior superior sagittal sinus. Functional MRI brain done 5/3, speech tasks demonstrate preferential activation of the left frontal lobe although there is some right frontal activation with object naming. The activated speech areas appear superior and posterior to the AVM nidus. s/p gamma knife radiosurgery on 6/30, 19Gy. On 12 day-2 week course of dex taper.\par \par On her first post-op visit, she is doing clinically well. Reports mild occasional headaches and tightness. Denies speech impairments or other symptoms of motor, sensory, speech, or visual abnormalities. \par \par \par Plan:\par Patient prefers waiting 1 full month post-gamma knife radiosurgery before returning to work\par Follow up in office in 2 weeks\par MRI brain w/wo in 6 months\par Repeat angiogram 1-3 years \par \par \par

## 2022-07-13 NOTE — HISTORY OF PRESENT ILLNESS
[FreeTextEntry1] : Milagros Carlin is a 52 year old female with no significant PMH who presented with who has been having on and off headaches in the past, saw neurologist Dr. Ping Hutchinson who ordered imaging and found to have left frontal lobe 1.2 x 2.3 cm AVM. She initially presented with occasional headaches. 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 On 5/3, she had a functional MRI brain done. On 6/30/2022, she underwent gamma knife radiosurgery with a frame, 19Gy. She was put on 2 week course of low dose dexamethasone. \par \par Today, she is doing overall well. Has occasional mild headaches and tightness primarily in th eback of her head, but being medically managed to OTC Tylenol. Denies issues with speech, and other symptoms of motor, sensory, speech, or visual abnormalities. \par Has a couple doses left of dex taper\par \par

## 2022-07-20 NOTE — ED ADULT TRIAGE NOTE - ARRIVAL FROM
Patient had 6-beat run of v-tach patient sitting on side of bed using urinal. Denies chest pain.     Patient lying in bed. Patient heart rate now fluctuating from 90s-140s.    R1 Labor Note    S: Patient evaluated at bedside for cervical change due to increasing pressure.     O:  T(C): 36.6 (20 @ 23:17), Max: 37.2 (20 @ 12:00)  HR: 86 (20 @ 01:20) (43 - 130)  BP: 105/73 (20 @ 01:20) (90/54 - 174/115)  RR: 16 (20 @ 05:40) (16 - 16)  SpO2: 92% (20 @ 01:19) (82% - 100%)    SVE: 10/100/+1  EFM: 135bpm, mod ofelia, +accel, -decels  [place]:  [id number]      A/P 30y P0 @39.2wks IOL A1. Will start pushing when patient feels contractions.   -IOL: on pitocin since 845a, IUPC in place.  -Cat 1 tracing  -GBS neg  -EFW 3200g  -COVID pend  -Analgesia epi in place  -Anticipate     d/w Dr. Frenkel Robyn Frankel PGY-2 Work

## 2022-07-27 ENCOUNTER — NON-APPOINTMENT (OUTPATIENT)
Age: 52
End: 2022-07-27

## 2022-07-27 ENCOUNTER — APPOINTMENT (OUTPATIENT)
Dept: NEUROSURGERY | Facility: CLINIC | Age: 52
End: 2022-07-27

## 2022-07-27 VITALS
OXYGEN SATURATION: 98 % | WEIGHT: 145 LBS | DIASTOLIC BLOOD PRESSURE: 72 MMHG | SYSTOLIC BLOOD PRESSURE: 114 MMHG | HEIGHT: 66 IN | TEMPERATURE: 98.4 F | BODY MASS INDEX: 23.3 KG/M2 | HEART RATE: 91 BPM

## 2022-07-27 PROCEDURE — 99024 POSTOP FOLLOW-UP VISIT: CPT

## 2022-07-27 NOTE — ASSESSMENT
[FreeTextEntry1] : Impression: \par 52F with no significant PMH p/w occasional headaches, MRI ordered by neurologist Dr. Ping Hutchinson, found to have left frontal lobe 1.2 x 2.3 cm arteriovenous malformation s/p dx angio on 4/21 confirming Spetzler Eric grade 2 AVM within L frontal lobe, with arterial feeders arising from the anterior division of the left middle cerebral artery, and venous outflow predominantly inferiorly and posteriorly into the left sigmoid sinus, and to a lesser extent into the anterior superior sagittal sinus. Functional MRI brain done 5/3, speech tasks demonstrate preferential activation of the left frontal lobe although there is some right frontal activation with object naming. The activated speech areas appear superior and posterior to the AVM nidus. s/p gamma knife radiosurgery on 6/30, 19Gy. \par \par On her second post-op visit, she is doing clinically well. Denies numbness, tightness, headaches, speech impairments or other symptoms of motor, or sensory abnormalities. States she has intermittent blurry vision, denies double vision. Assured this is likely unrelated due to the location of the AVM. \par \par \par Plan:\par Ok for patient to return to work from a neurosurgical standpoint\par MRI brain w/wo in 6 months - December 2022\par Repeat angiogram 1-3 years \par \par \par

## 2022-07-27 NOTE — HISTORY OF PRESENT ILLNESS
[FreeTextEntry1] : Milagros Carlin is a 52 year old female with no significant PMH who presented with who has been having on and off headaches in the past, saw neurologist Dr. Ping Hutchinson who ordered imaging and found to have left frontal lobe 1.2 x 2.3 cm AVM. She initially presented with occasional headaches. 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 On 5/3, she had a functional MRI brain done. On 6/30/2022, she underwent gamma knife radiosurgery with a frame, 19Gy. She was put on 2 week course of low dose dexamethasone. \par \par Today, she is doing overall well. States her tightness, numbness primarily in the back of her head has resolved. Denies issues with speech, and other symptoms of motor or sensory abnormalities. FInished course of dex taper.\par \par

## 2022-09-12 ENCOUNTER — NON-APPOINTMENT (OUTPATIENT)
Age: 52
End: 2022-09-12

## 2022-09-12 ENCOUNTER — APPOINTMENT (OUTPATIENT)
Dept: RADIATION ONCOLOGY | Facility: CLINIC | Age: 52
End: 2022-09-12

## 2022-09-26 ENCOUNTER — APPOINTMENT (OUTPATIENT)
Dept: RADIATION ONCOLOGY | Facility: CLINIC | Age: 52
End: 2022-09-26

## 2022-09-26 VITALS
TEMPERATURE: 97.52 F | DIASTOLIC BLOOD PRESSURE: 73 MMHG | RESPIRATION RATE: 17 BRPM | HEART RATE: 73 BPM | SYSTOLIC BLOOD PRESSURE: 112 MMHG | WEIGHT: 148.99 LBS | BODY MASS INDEX: 24.05 KG/M2 | OXYGEN SATURATION: 100 %

## 2022-09-26 PROCEDURE — 99024 POSTOP FOLLOW-UP VISIT: CPT

## 2022-09-26 NOTE — VITALS
[Least Pain Intensity: 0/10] : 0/10 [90: Able to carry normal activity; minor signs or symptoms of disease.] : 90: Able to carry normal activity; minor signs or symptoms of disease.  [Date: ____________] : Patient's last distress assessment performed on [unfilled]. [5 - Distress Level] : Distress Level: 5 [Maximal Pain Intensity: 6/10] : 6/10 [FreeTextEntry7] : social work offered patient refused

## 2022-09-26 NOTE — PHYSICAL EXAM
[General Appearance - Well Developed] : well developed [Normal] : oriented to person, place and time, the affect was normal, the mood was normal and not anxious

## 2022-09-26 NOTE — HISTORY OF PRESENT ILLNESS
[FreeTextEntry1] : Ms. Carlin presented initially on 5/24/2022 for consideration for radiation therapy.\par She completed gamma knife radiation therapy on 6/30/2022 for a total of 1900 cgy to a left frontal AVM.\par \par RELEVANT MEDICAL HISTORY\par Ms. Carlin underwent a brain MRI 3/28/2022 for investigation of headaches in the context of her cousin having an aneurysm.\par \par Brain MRI done 3/28/2022 showed an AVM within the inferior gyrus of the left frontal lobe fed by the superior branch of the left MCA, 1.2 X 1.. 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 denied nausea, vomiting, focal weakness, numbness, tingling. \par \par 9/26/2022- Ms. Carlin presents today for PTE. feeling well. has headaches for which she follows with a neurologist. had slight nausea after treatment, now resolved. had a few episodes of "waterfalls" in her peripheral vision after treatment, no longer having these.\par Once in a while has a left sided pressure, not severe. has not had a bad headache in a long time. notes she tries to control her stress as best as possible to avoid headaches.

## 2022-09-26 NOTE — REVIEW OF SYSTEMS
[Negative] : Respiratory [Urinary Frequency] : urinary frequency [Fever] : no fever [Chills] : no chills [Night Sweats] : no night sweats [Eye Pain] : no eye pain [Red Eyes] : eyes not red [Dysphagia] : no dysphagia [Loss of Hearing] : no loss of hearing [Nosebleeds] : no nosebleeds [Shortness Of Breath] : no shortness of breath [Cough] : no cough [Abdominal Pain] : no abdominal pain [Vomiting] : no vomiting [Confused] : no confusion [Dizziness] : no dizziness [Fainting] : no fainting [Difficulty Walking] : no difficulty walking [FreeTextEntry3] : had a few episodes of noting "waterfalls" in peripheral vision after radiation, but has not had those in a long time [FreeTextEntry7] : had some nausea after treatment, no longer with nausea or vomiting [FreeTextEntry9] : denies focal weakness [de-identified] : had some darkening of hand skin, no longer [de-identified] : still with headaches, which come and go. has not had a severed headache in a long time.

## 2023-01-09 ENCOUNTER — APPOINTMENT (OUTPATIENT)
Dept: MRI IMAGING | Facility: CLINIC | Age: 53
End: 2023-01-09
Payer: COMMERCIAL

## 2023-01-09 ENCOUNTER — OUTPATIENT (OUTPATIENT)
Dept: OUTPATIENT SERVICES | Facility: HOSPITAL | Age: 53
LOS: 1 days | End: 2023-01-09
Payer: COMMERCIAL

## 2023-01-09 DIAGNOSIS — Z98.890 OTHER SPECIFIED POSTPROCEDURAL STATES: Chronic | ICD-10-CM

## 2023-01-09 DIAGNOSIS — Z00.00 ENCOUNTER FOR GENERAL ADULT MEDICAL EXAMINATION WITHOUT ABNORMAL FINDINGS: ICD-10-CM

## 2023-01-09 PROCEDURE — 70545 MR ANGIOGRAPHY HEAD W/DYE: CPT | Mod: 26,59

## 2023-01-09 PROCEDURE — 70553 MRI BRAIN STEM W/O & W/DYE: CPT | Mod: 26

## 2023-01-09 PROCEDURE — 70545 MR ANGIOGRAPHY HEAD W/DYE: CPT

## 2023-01-09 PROCEDURE — A9585: CPT

## 2023-01-09 PROCEDURE — 70553 MRI BRAIN STEM W/O & W/DYE: CPT

## 2023-01-10 ENCOUNTER — TRANSCRIPTION ENCOUNTER (OUTPATIENT)
Age: 53
End: 2023-01-10

## 2023-01-18 ENCOUNTER — NON-APPOINTMENT (OUTPATIENT)
Age: 53
End: 2023-01-18

## 2023-01-18 ENCOUNTER — APPOINTMENT (OUTPATIENT)
Dept: NEUROSURGERY | Facility: CLINIC | Age: 53
End: 2023-01-18
Payer: COMMERCIAL

## 2023-01-18 VITALS
TEMPERATURE: 208.58 F | OXYGEN SATURATION: 99 % | DIASTOLIC BLOOD PRESSURE: 61 MMHG | HEART RATE: 79 BPM | BODY MASS INDEX: 23.3 KG/M2 | SYSTOLIC BLOOD PRESSURE: 95 MMHG | WEIGHT: 145 LBS | HEIGHT: 66 IN

## 2023-01-18 PROCEDURE — 99214 OFFICE O/P EST MOD 30 MIN: CPT

## 2023-01-18 RX ORDER — LORAZEPAM 0.5 MG/1
0.5 TABLET ORAL
Qty: 2 | Refills: 0 | Status: DISCONTINUED | COMMUNITY
Start: 2023-01-03 | End: 2023-01-18

## 2023-01-19 NOTE — HISTORY OF PRESENT ILLNESS
[FreeTextEntry1] : BC SOLIMAN is a 52 year old female with no significant PMH who presented with who has been having on and off headaches in the past, saw neurologist Dr. Ping Hutchinson who ordered imaging and found to have left frontal lobe 1.2 x 2.3 cm AVM. She initially presented with occasional headaches. 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 On 5/3, she had a functional MRI brain done. On 6/30/2022, she underwent gamma knife radiosurgery with a frame, 19Gy. She was put on 2 week course of low dose dexamethasone. \par \par Today, she is doing overall well. Reports mild occasional head pressure but not too bothersome. Denies issues with speech, and other symptoms of motor or sensory abnormalities. She underwent repeat brain imaging with MRI and MRA and presents for a follow up visit after imaging. \par

## 2023-01-19 NOTE — DATA REVIEWED
[de-identified] : MRI brain w/wo IV contrast 1/9/23 [de-identified] : MRA brain w/ IV contrast 1/9/23

## 2023-01-19 NOTE — ASSESSMENT
[FreeTextEntry1] : IMPRESSION:\par 52F with no significant PMH p/w occasional headaches, MRI ordered by neurologist Dr. Ping Hutchinson, found to have left frontal lobe 1.2 x 2.3 cm arteriovenous malformation s/p dx angio on 4/21 confirming Spetzler Eric grade 2 AVM within L frontal lobe, with arterial feeders arising from the anterior division of the left middle cerebral artery, and venous outflow predominantly inferiorly and posteriorly into the left sigmoid sinus, and to a lesser extent into the anterior superior sagittal sinus. Functional MRI brain done 5/3, speech tasks demonstrate preferential activation of the left frontal lobe although there is some right frontal activation with object naming. The activated speech areas appear superior and posterior to the AVM nidus. s/p gamma knife radiosurgery on 6/30, 19Gy in one treatment. \par \par Patient is doing clinically well. Reports mild occasional headaches. Denies numbness, tightness, speech impairments or other symptoms of motor, sensory, or visual abnormalities. \par \par MRI/A brain 1/9/23 shows mild decrease in size of left frontal AVM, measuring 1.5cm x 1.7cm, previously 1.7cm x 2.0cm. No adverse interval change. Minimal edema surrounding it and no signs of hemorrhage. \par \par Explained to patient that AVM treatment with gamma knife can take up to 3 years sometimes to resolve. We use the MRIs periodically to check in to make sure radiation necrosis or significant edema is not developing. Repeat angiography will be done within the next few years once it appears it is going away on MRI. \par \par \par PLAN:\par MRI brain w/wo IV contrast, MRA brain w/ IV contrast 1 year post-treatment - end of June/early July 2023\par Follow up after to discuss results\par Pre-MRI anti-anxiety medication ordered per patient's request\par Repeat cerebral angiogram, which will definitively confirm AVM obliteration, in the next few years once there are signs of resolution on MRI\par Plan to fax over note to neurologist, Dr. Ping Hutchinson, as requested by patient (fax: [phone])\par

## 2023-03-02 ENCOUNTER — APPOINTMENT (OUTPATIENT)
Dept: CARDIOLOGY | Facility: CLINIC | Age: 53
End: 2023-03-02
Payer: COMMERCIAL

## 2023-03-02 ENCOUNTER — NON-APPOINTMENT (OUTPATIENT)
Age: 53
End: 2023-03-02

## 2023-03-02 VITALS
HEIGHT: 66 IN | SYSTOLIC BLOOD PRESSURE: 104 MMHG | HEART RATE: 75 BPM | BODY MASS INDEX: 23.3 KG/M2 | OXYGEN SATURATION: 98 % | WEIGHT: 145 LBS | DIASTOLIC BLOOD PRESSURE: 70 MMHG

## 2023-03-02 PROCEDURE — 99203 OFFICE O/P NEW LOW 30 MIN: CPT | Mod: 25

## 2023-03-02 PROCEDURE — 93000 ELECTROCARDIOGRAM COMPLETE: CPT

## 2023-03-02 RX ORDER — ALPRAZOLAM 0.25 MG/1
0.25 TABLET ORAL
Qty: 2 | Refills: 0 | Status: DISCONTINUED | COMMUNITY
Start: 2023-01-18 | End: 2023-03-02

## 2023-03-02 NOTE — DISCUSSION/SUMMARY
[___ Week(s)] : in [unfilled] week(s) [FreeTextEntry3] : Echocardiogram, regular stress test, 6-month follow-up visit. [FreeTextEntry1] : I have ordered an echocardiogram to assess LV function and valvular status.  I will order a regular treadmill stress test to assess cardiac fitness and rule out any provocable ECG changes as well as check vital assessment to exercise.  I recommended a heart healthy lifestyle including a low-saturated fat, low cholesterol diet with improved aerobic physical fitness over time for cardiovascular benefits.  We will call the patient with test results and followup with you for general care.  Otherwise see me in 6 months or sooner if needed.

## 2023-03-02 NOTE — HISTORY OF PRESENT ILLNESS
[FreeTextEntry1] : She is a pleasant 52-year-old female with history of brain AVM treated with gamma knife therapy and her AVM has improved in size with less headaches, comes to our attention for cardiac work-up.  She denies history of dyslipidemia, diabetes and is a non-smoker.  No premature CAD in an immediate family member.  She has been experiencing left-sided chest discomfort that short-lived not directly effort related.

## 2023-03-09 ENCOUNTER — APPOINTMENT (OUTPATIENT)
Dept: CARDIOLOGY | Facility: CLINIC | Age: 53
End: 2023-03-09
Payer: COMMERCIAL

## 2023-03-09 PROCEDURE — 93306 TTE W/DOPPLER COMPLETE: CPT

## 2023-03-10 ENCOUNTER — APPOINTMENT (OUTPATIENT)
Dept: INTERNAL MEDICINE | Facility: CLINIC | Age: 53
End: 2023-03-10
Payer: COMMERCIAL

## 2023-03-10 VITALS
HEIGHT: 66 IN | WEIGHT: 150 LBS | DIASTOLIC BLOOD PRESSURE: 64 MMHG | SYSTOLIC BLOOD PRESSURE: 116 MMHG | HEART RATE: 88 BPM | RESPIRATION RATE: 16 BRPM | BODY MASS INDEX: 24.11 KG/M2 | OXYGEN SATURATION: 98 %

## 2023-03-10 DIAGNOSIS — Z12.11 ENCOUNTER FOR SCREENING FOR MALIGNANT NEOPLASM OF COLON: ICD-10-CM

## 2023-03-10 DIAGNOSIS — Z13.1 ENCOUNTER FOR SCREENING FOR DIABETES MELLITUS: ICD-10-CM

## 2023-03-10 DIAGNOSIS — Z13.21 ENCOUNTER FOR SCREENING FOR NUTRITIONAL DISORDER: ICD-10-CM

## 2023-03-10 DIAGNOSIS — Z13.0 ENCOUNTER FOR SCREENING FOR DISEASES OF THE BLOOD AND BLOOD-FORMING ORGANS AND CERTAIN DISORDERS INVOLVING THE IMMUNE MECHANISM: ICD-10-CM

## 2023-03-10 DIAGNOSIS — Z13.220 ENCOUNTER FOR SCREENING FOR LIPOID DISORDERS: ICD-10-CM

## 2023-03-10 DIAGNOSIS — Z12.39 ENCOUNTER FOR OTHER SCREENING FOR MALIGNANT NEOPLASM OF BREAST: ICD-10-CM

## 2023-03-10 DIAGNOSIS — Z12.4 ENCOUNTER FOR SCREENING FOR MALIGNANT NEOPLASM OF CERVIX: ICD-10-CM

## 2023-03-10 DIAGNOSIS — Z13.29 ENCOUNTER FOR SCREENING FOR OTHER SUSPECTED ENDOCRINE DISORDER: ICD-10-CM

## 2023-03-10 PROCEDURE — 99203 OFFICE O/P NEW LOW 30 MIN: CPT

## 2023-03-10 NOTE — PLAN
[FreeTextEntry1] : We will do routine blood work in the form of CBC, CMP, A1c, lipid, TSH, B12 folate at this point.\par she may follow-up earlier if needed\par Pt was told to call with problems or concerns. The patient was told to seek immediate attention by calling 911 or going to the ER if her condition does not improve or gets acutely worse.\par

## 2023-03-10 NOTE — HISTORY OF PRESENT ILLNESS
[FreeTextEntry1] : 52-year-old female is here for checkup. [de-identified] : 52-year-old female is here for checkup.  This is my first time seeing this patient.\par \par Patient denies any fevers, chills, nausea, vomiting, diarrhea, constipation, chest pain, shortness of breath, weakness, numbness, tingling at this time.\par \par Ports she had the flu vaccine this year\par Reports she has had 2 COVID vaccines in the past\par \par

## 2023-03-10 NOTE — PHYSICAL EXAM
[Normal] : normal rate, regular rhythm, normal S1 and S2 and no murmur heard [No Varicosities] : no varicosities [No Edema] : there was no peripheral edema [No Extremity Clubbing/Cyanosis] : no extremity clubbing/cyanosis [Soft] : abdomen soft [Non Tender] : non-tender [Non-distended] : non-distended [Normal Bowel Sounds] : normal bowel sounds

## 2023-03-10 NOTE — HEALTH RISK ASSESSMENT
[Good] : ~his/her~  mood as  good [No] : In the past 12 months have you used drugs other than those required for medical reasons? No [0] : 2) Feeling down, depressed, or hopeless: Not at all (0) [Patient reported mammogram was normal] : Patient reported mammogram was normal [Patient reported PAP Smear was normal] : Patient reported PAP Smear was normal [None] : None [Retired] : retired [College] : College [Single] : single [Fully functional (bathing, dressing, toileting, transferring, walking, feeding)] : Fully functional (bathing, dressing, toileting, transferring, walking, feeding) [Fully functional (using the telephone, shopping, preparing meals, housekeeping, doing laundry, using] : Fully functional and needs no help or supervision to perform IADLs (using the telephone, shopping, preparing meals, housekeeping, doing laundry, using transportation, managing medications and managing finances) [Never] : Never [de-identified] : Trying to be good with diet. [[id number]] : 0 [Patient reported colonoscopy was normal] : Patient reported colonoscopy was normal [MammogramDate] : 01/22 [MammogramComments] : GYN gives the mammograms [PapSmearDate] : 11/22 [PapSmearComments] : Has a GYN. Gets annual pap smears. [ColonoscopyDate] : 3 years ago [ColonoscopyComments] : Will see a GI this year. Dr. Vanessa Suarez  [de-identified] : Lives with daughter [de-identified] :  for 20 years. Completely Retired in January 2023. [de-identified] : Masters Degree

## 2023-03-15 ENCOUNTER — NON-APPOINTMENT (OUTPATIENT)
Age: 53
End: 2023-03-15

## 2023-03-15 LAB
ALBUMIN SERPL ELPH-MCNC: 4.2 G/DL
ALP BLD-CCNC: 44 U/L
ALT SERPL-CCNC: 14 U/L
ANION GAP SERPL CALC-SCNC: 12 MMOL/L
AST SERPL-CCNC: 20 U/L
BASOPHILS # BLD AUTO: 0.04 K/UL
BASOPHILS NFR BLD AUTO: 0.6 %
BILIRUB SERPL-MCNC: 0.4 MG/DL
BUN SERPL-MCNC: 9 MG/DL
CALCIUM SERPL-MCNC: 9.2 MG/DL
CHLORIDE SERPL-SCNC: 104 MMOL/L
CHOLEST SERPL-MCNC: 159 MG/DL
CO2 SERPL-SCNC: 23 MMOL/L
CREAT SERPL-MCNC: 1.3 MG/DL
EGFR: 49 ML/MIN/1.73M2
EOSINOPHIL # BLD AUTO: 0.33 K/UL
EOSINOPHIL NFR BLD AUTO: 4.7 %
ESTIMATED AVERAGE GLUCOSE: 126 MG/DL
FERRITIN SERPL-MCNC: 14 NG/ML
FOLATE SERPL-MCNC: 11.5 NG/ML
GLUCOSE SERPL-MCNC: 100 MG/DL
HBA1C MFR BLD HPLC: 6 %
HCT VFR BLD CALC: 36.5 %
HDLC SERPL-MCNC: 74 MG/DL
HGB BLD-MCNC: 11.2 G/DL
IMM GRANULOCYTES NFR BLD AUTO: 0.3 %
IRON SATN MFR SERPL: 28 %
IRON SERPL-MCNC: 98 UG/DL
LDLC SERPL CALC-MCNC: 73 MG/DL
LYMPHOCYTES # BLD AUTO: 2.1 K/UL
LYMPHOCYTES NFR BLD AUTO: 30.2 %
MAN DIFF?: NORMAL
MCHC RBC-ENTMCNC: 22.8 PG
MCHC RBC-ENTMCNC: 30.7 GM/DL
MCV RBC AUTO: 74.2 FL
MONOCYTES # BLD AUTO: 0.53 K/UL
MONOCYTES NFR BLD AUTO: 7.6 %
NEUTROPHILS # BLD AUTO: 3.93 K/UL
NEUTROPHILS NFR BLD AUTO: 56.6 %
NONHDLC SERPL-MCNC: 85 MG/DL
PLATELET # BLD AUTO: 296 K/UL
POTASSIUM SERPL-SCNC: 4.3 MMOL/L
PROT SERPL-MCNC: 6.8 G/DL
RBC # BLD: 4.92 M/UL
RBC # FLD: 16.2 %
SODIUM SERPL-SCNC: 139 MMOL/L
TIBC SERPL-MCNC: 352 UG/DL
TRIGL SERPL-MCNC: 60 MG/DL
TSH SERPL-ACNC: 2.48 UIU/ML
UIBC SERPL-MCNC: 254 UG/DL
VIT B12 SERPL-MCNC: 437 PG/ML
WBC # FLD AUTO: 6.95 K/UL

## 2023-05-02 ENCOUNTER — APPOINTMENT (OUTPATIENT)
Dept: CARDIOLOGY | Facility: CLINIC | Age: 53
End: 2023-05-02
Payer: COMMERCIAL

## 2023-05-02 PROCEDURE — 93015 CV STRESS TEST SUPVJ I&R: CPT

## 2023-07-05 ENCOUNTER — APPOINTMENT (OUTPATIENT)
Dept: MRI IMAGING | Facility: CLINIC | Age: 53
End: 2023-07-05
Payer: COMMERCIAL

## 2023-07-05 ENCOUNTER — OUTPATIENT (OUTPATIENT)
Dept: OUTPATIENT SERVICES | Facility: HOSPITAL | Age: 53
LOS: 1 days | End: 2023-07-05
Payer: COMMERCIAL

## 2023-07-05 DIAGNOSIS — Z00.8 ENCOUNTER FOR OTHER GENERAL EXAMINATION: ICD-10-CM

## 2023-07-05 DIAGNOSIS — Z98.890 OTHER SPECIFIED POSTPROCEDURAL STATES: Chronic | ICD-10-CM

## 2023-07-05 PROCEDURE — 70545 MR ANGIOGRAPHY HEAD W/DYE: CPT

## 2023-07-05 PROCEDURE — 70553 MRI BRAIN STEM W/O & W/DYE: CPT | Mod: 26

## 2023-07-05 PROCEDURE — 70545 MR ANGIOGRAPHY HEAD W/DYE: CPT | Mod: 26,59

## 2023-07-05 PROCEDURE — 70553 MRI BRAIN STEM W/O & W/DYE: CPT

## 2023-07-05 PROCEDURE — A9585: CPT

## 2023-07-12 ENCOUNTER — APPOINTMENT (OUTPATIENT)
Dept: NEUROSURGERY | Facility: CLINIC | Age: 53
End: 2023-07-12
Payer: COMMERCIAL

## 2023-07-12 VITALS
WEIGHT: 150 LBS | TEMPERATURE: 208.76 F | OXYGEN SATURATION: 99 % | DIASTOLIC BLOOD PRESSURE: 58 MMHG | BODY MASS INDEX: 24.11 KG/M2 | SYSTOLIC BLOOD PRESSURE: 93 MMHG | HEART RATE: 79 BPM | HEIGHT: 66 IN

## 2023-07-12 PROCEDURE — 99214 OFFICE O/P EST MOD 30 MIN: CPT

## 2023-07-12 NOTE — DATA REVIEWED
[de-identified] : MRI head w/wo IV contrast 7/5/23 [de-identified] : MRA head w/ IV contrast 7/5/23

## 2023-07-12 NOTE — HISTORY OF PRESENT ILLNESS
[FreeTextEntry1] : BC SOLIMAN is a 53 year old female with no significant PMH who presented with who has been having on and off headaches in the past, saw neurologist Dr. Ping Hutchinson who ordered imaging and found to have left frontal lobe 1.2 x 2.3 cm AVM. She initially presented with occasional headaches. 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 On 5/3, she had a functional MRI brain done. On 6/30/2022, she underwent gamma knife radiosurgery with a frame, 19Gy. She was put on 2 week course of low dose dexamethasone. \par \par Today, she presents for her annual follow up visit after undergoing MRI/MRA on 7/5/23. She is doing overall well. Reports mild occasional head pressure but not too bothersome. Denies issues with speech, and other symptoms of motor or sensory abnormalities. \par

## 2023-07-12 NOTE — REASON FOR VISIT
[Follow-Up: _____] : a [unfilled] follow-up visit [FreeTextEntry1] : Reviewed results of MRI head w/wo IV contrast, MRA head w/ IV contrast done 7/5/23\par \par s/p Gamma knife radiosurgery 6/30/22

## 2023-07-12 NOTE — ASSESSMENT
[FreeTextEntry1] : IMPRESSION:\par 53F with no significant PMH p/w occasional headaches, MRI ordered by neurologist Dr. Ping Hutchinson, found to have left frontal lobe 1.2 x 2.3 cm arteriovenous malformation s/p dx angio on 4/21 confirming Spetzler Eric grade 2 AVM within L frontal lobe, with arterial feeders arising from the anterior division of the left middle cerebral artery, and venous outflow predominantly inferiorly and posteriorly into the left sigmoid sinus, and to a lesser extent into the anterior superior sagittal sinus. Functional MRI brain done 5/3, speech tasks demonstrate preferential activation of the left frontal lobe although there is some right frontal activation with object naming. The activated speech areas appear superior and posterior to the AVM nidus. s/p gamma knife radiosurgery on 6/30, 19Gy in one treatment. \par \par On her annual follow up, she is doing clinically well. Reports mild occasional headaches. Denies numbness, tightness, speech impairments or other symptoms of motor, sensory, or visual abnormalities. \par \par Annual MRI/MRA 7/5/23 shows mildly diminished left frontal AVM, minimal edema surrounding it, no signs of hemorrhage. \par \par Explained to patient that AVM treatment with gamma knife can take up to 3 years sometimes to resolve. We use the MRIs periodically to check in to make sure radiation necrosis or significant edema is not developing. Repeat angiography will be done within the next few years once it appears it is going away on MRI.\par \par \par \par PLAN:\par MRI brain w/wo IV contrast, MRA brain w/ IV contrast in 1 year- end of June/early July 2024\par Follow up after to discuss results\par Pre-MRI anti-anxiety medication to be ordered per patient's request\par Repeat cerebral angiogram, which will definitively confirm AVM obliteration, in the next 2-3 years once there are signs of resolution on MRI\par Plan to fax over note to neurologist, Dr. Ping Hutchinson, as requested by patient (fax: [phone])

## 2023-07-13 ENCOUNTER — APPOINTMENT (OUTPATIENT)
Dept: RADIATION ONCOLOGY | Facility: CLINIC | Age: 53
End: 2023-07-13
Payer: COMMERCIAL

## 2023-07-13 VITALS
HEART RATE: 72 BPM | WEIGHT: 145.71 LBS | SYSTOLIC BLOOD PRESSURE: 113 MMHG | HEIGHT: 66 IN | BODY MASS INDEX: 23.42 KG/M2 | OXYGEN SATURATION: 100 % | DIASTOLIC BLOOD PRESSURE: 72 MMHG | RESPIRATION RATE: 18 BRPM | TEMPERATURE: 98.06 F

## 2023-07-13 PROCEDURE — 99213 OFFICE O/P EST LOW 20 MIN: CPT

## 2023-07-13 NOTE — PHYSICAL EXAM
[General Appearance - Well Developed] : well developed [Normal] : oriented to person, place and time, the affect was normal, the mood was normal and not anxious [] : no respiratory distress [Heart Rate And Rhythm] : heart rate and rhythm were normal

## 2023-07-15 NOTE — ASSESSMENT
[Work-up necessary to assess local, regional or metastatic recurrence] : Work-up necessary to assess local, regional or metastatic recurrence [FreeTextEntry1] : benign disease

## 2023-07-15 NOTE — VITALS
[Maximal Pain Intensity: 6/10] : 6/10 [Least Pain Intensity: 0/10] : 0/10 [Date: ____________] : Patient's last distress assessment performed on [unfilled]. [5 - Distress Level] : Distress Level: 5 [100: Normal, no complaints, no evidence of disease.] : 100: Normal, no complaints, no evidence of disease. [ECOG Performance Status: 0 - Fully active, able to carry on all pre-disease performance without restriction] : Performance Status: 0 - Fully active, able to carry on all pre-disease performance without restriction [FreeTextEntry7] : social work offered patient refused

## 2023-07-15 NOTE — HISTORY OF PRESENT ILLNESS
[FreeTextEntry1] : Ms. Carlin presented initially on 5/24/2022 for consideration for radiation therapy.\par She completed gamma knife radiation therapy on 6/30/2022 for a total of 1900 cgy over 1 Fx to a left frontal AVM.\par \par RELEVANT MEDICAL HISTORY\par Ms. Carlin underwent a brain MRI 3/28/2022 for investigation of headaches in the context of her cousin having an aneurysm.\par \par Brain MRI done 3/28/2022 showed an AVM within the inferior gyrus of the left frontal lobe fed by the superior branch of the left MCA, 1.2 X 1.. 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 denied nausea, vomiting, focal weakness, numbness, tingling. \par \par 9/26/2022- Ms. Carlin presents today for PTE. feeling well. has headaches for which she follows with a neurologist. had slight nausea after treatment, now resolved. had a few episodes of "waterfalls" in her peripheral vision after treatment, no longer having these.\par Once in a while has a left sided pressure, not severe. has not had a bad headache in a long time. notes she tries to control her stress as best as possible to avoid headaches.\par \par Visit dated 7/13/2023\par Patient returns for routine f/u with completed images for review\par Reports having an  occasional HA but not similar to her initial presenting episodes\par Denies any new focal deficits or concerns during today's visit and has resumed all activities w/o difficulty\par MRI brain w w/o contrast 7/5/2023\par MPRESSION:\par \par 1. BRAIN: Left frontal arteriovenous malformation appears mildly diminished compared to 1/9/2023.\par 2. ANTERIOR CIRCULATION: Asymmetric hypertrophy of the left MCA proximal segments may be mildly diminished compared to 1/9/2023.\par 3. POSTERIOR CIRCULATION: Intact.

## 2023-07-15 NOTE — DISEASE MANAGEMENT
[Clinical] : TNM Stage: c [N/A] : Currently not applicable [TTNM] : X [NTNM] : X [MTNM] : X [de-identified] : GKRS on 6/30/2022 for a total of 1900 cgy to a left frontal AVM.

## 2023-07-15 NOTE — REVIEW OF SYSTEMS
[Urinary Frequency] : urinary frequency [Negative] : Eyes [Fever] : no fever [Chills] : no chills [Night Sweats] : no night sweats [Eye Pain] : no eye pain [Red Eyes] : eyes not red [Dysphagia] : no dysphagia [Loss of Hearing] : no loss of hearing [Nosebleeds] : no nosebleeds [Shortness Of Breath] : no shortness of breath [Cough] : no cough [Abdominal Pain] : no abdominal pain [Vomiting] : no vomiting [Confused] : no confusion [Dizziness] : no dizziness [Fainting] : no fainting [Difficulty Walking] : no difficulty walking [FreeTextEntry9] : denies focal weakness [de-identified] : occasional HA but not similar to her initial presenting episodes

## 2023-09-11 ENCOUNTER — APPOINTMENT (OUTPATIENT)
Dept: CARDIOLOGY | Facility: CLINIC | Age: 53
End: 2023-09-11
Payer: COMMERCIAL

## 2023-09-11 ENCOUNTER — APPOINTMENT (OUTPATIENT)
Dept: INTERNAL MEDICINE | Facility: CLINIC | Age: 53
End: 2023-09-11
Payer: COMMERCIAL

## 2023-09-11 ENCOUNTER — NON-APPOINTMENT (OUTPATIENT)
Age: 53
End: 2023-09-11

## 2023-09-11 VITALS
HEIGHT: 66 IN | OXYGEN SATURATION: 99 % | DIASTOLIC BLOOD PRESSURE: 70 MMHG | RESPIRATION RATE: 16 BRPM | BODY MASS INDEX: 23.63 KG/M2 | WEIGHT: 147 LBS | SYSTOLIC BLOOD PRESSURE: 118 MMHG | HEART RATE: 74 BPM

## 2023-09-11 VITALS
OXYGEN SATURATION: 98 % | WEIGHT: 145 LBS | BODY MASS INDEX: 23.3 KG/M2 | HEIGHT: 66 IN | DIASTOLIC BLOOD PRESSURE: 68 MMHG | SYSTOLIC BLOOD PRESSURE: 104 MMHG | HEART RATE: 85 BPM

## 2023-09-11 PROCEDURE — 99214 OFFICE O/P EST MOD 30 MIN: CPT | Mod: 25

## 2023-09-11 PROCEDURE — 93000 ELECTROCARDIOGRAM COMPLETE: CPT

## 2023-09-11 PROCEDURE — 36415 COLL VENOUS BLD VENIPUNCTURE: CPT

## 2023-09-11 PROCEDURE — 99213 OFFICE O/P EST LOW 20 MIN: CPT | Mod: 25

## 2024-02-28 NOTE — ED ADULT TRIAGE NOTE - NS ED TRIAGE HISTORIAN
Add 52 Modifier (Optional): no Medical Necessity Clause: This procedure was medically necessary because the lesions that were treated were: Show Topical Anesthesia Variable?: Yes Medical Necessity Information: It is in your best interest to select a reason for this procedure from the list below. All of these items fulfill various CMS LCD requirements except the new and changing color options. Consent: The patient's consent was obtained including but not limited to risks of crusting, scabbing, blistering, scarring, darker or lighter pigmentary change, recurrence, incomplete removal and infection. Post-Care Instructions: I reviewed with the patient in detail post-care instructions. Patient is to wear sunprotection, and avoid picking at any of the treated lesions. Pt may apply Vaseline to crusted or scabbing areas. Detail Level: Detailed Spray Paint Text: The liquid nitrogen was applied to the skin utilizing a spray paint frosting technique. Patient/EMS

## 2024-03-01 ENCOUNTER — APPOINTMENT (OUTPATIENT)
Dept: CARDIOLOGY | Facility: CLINIC | Age: 54
End: 2024-03-01

## 2024-03-11 ENCOUNTER — APPOINTMENT (OUTPATIENT)
Dept: INTERNAL MEDICINE | Facility: CLINIC | Age: 54
End: 2024-03-11
Payer: COMMERCIAL

## 2024-03-11 VITALS
BODY MASS INDEX: 23.63 KG/M2 | RESPIRATION RATE: 16 BRPM | OXYGEN SATURATION: 99 % | DIASTOLIC BLOOD PRESSURE: 70 MMHG | HEIGHT: 66 IN | HEART RATE: 87 BPM | WEIGHT: 147 LBS | SYSTOLIC BLOOD PRESSURE: 118 MMHG

## 2024-03-11 DIAGNOSIS — Z00.00 ENCOUNTER FOR GENERAL ADULT MEDICAL EXAMINATION W/OUT ABNORMAL FINDINGS: ICD-10-CM

## 2024-03-11 DIAGNOSIS — Q28.2 ARTERIOVENOUS MALFORMATION OF CEREBRAL VESSELS: ICD-10-CM

## 2024-03-11 DIAGNOSIS — D50.9 IRON DEFICIENCY ANEMIA, UNSPECIFIED: ICD-10-CM

## 2024-03-11 DIAGNOSIS — R79.0 ABNORMAL LVL OF BLOOD MINERAL: ICD-10-CM

## 2024-03-11 LAB
ALBUMIN SERPL ELPH-MCNC: 4.6 G/DL
ALP BLD-CCNC: 42 U/L
ALT SERPL-CCNC: 11 U/L
ANION GAP SERPL CALC-SCNC: 11 MMOL/L
AST SERPL-CCNC: 15 U/L
BASOPHILS # BLD AUTO: 0.05 K/UL
BASOPHILS NFR BLD AUTO: 0.7 %
BILIRUB SERPL-MCNC: 0.3 MG/DL
BUN SERPL-MCNC: 9 MG/DL
CALCIUM SERPL-MCNC: 9.6 MG/DL
CHLORIDE SERPL-SCNC: 104 MMOL/L
CO2 SERPL-SCNC: 24 MMOL/L
CREAT SERPL-MCNC: 1.21 MG/DL
EGFR: 54 ML/MIN/1.73M2
EOSINOPHIL # BLD AUTO: 0.17 K/UL
EOSINOPHIL NFR BLD AUTO: 2.5 %
ESTIMATED AVERAGE GLUCOSE: 128 MG/DL
FERRITIN SERPL-MCNC: 13 NG/ML
GLUCOSE SERPL-MCNC: 94 MG/DL
HBA1C MFR BLD HPLC: 6.1 %
HCT VFR BLD CALC: 39 %
HGB BLD-MCNC: 11.9 G/DL
IMM GRANULOCYTES NFR BLD AUTO: 0.3 %
IRON SATN MFR SERPL: 36 %
IRON SERPL-MCNC: 143 UG/DL
LYMPHOCYTES # BLD AUTO: 2.57 K/UL
LYMPHOCYTES NFR BLD AUTO: 37.3 %
MAN DIFF?: NORMAL
MCHC RBC-ENTMCNC: 23 PG
MCHC RBC-ENTMCNC: 30.5 GM/DL
MCV RBC AUTO: 75.4 FL
MONOCYTES # BLD AUTO: 0.49 K/UL
MONOCYTES NFR BLD AUTO: 7.1 %
NEUTROPHILS # BLD AUTO: 3.59 K/UL
NEUTROPHILS NFR BLD AUTO: 52.1 %
PLATELET # BLD AUTO: 290 K/UL
POTASSIUM SERPL-SCNC: 4.7 MMOL/L
PROT SERPL-MCNC: 7.6 G/DL
RBC # BLD: 5.17 M/UL
RBC # FLD: 16.5 %
SODIUM SERPL-SCNC: 138 MMOL/L
TIBC SERPL-MCNC: 401 UG/DL
UIBC SERPL-MCNC: 258 UG/DL
WBC # FLD AUTO: 6.89 K/UL

## 2024-03-11 PROCEDURE — G2211 COMPLEX E/M VISIT ADD ON: CPT

## 2024-03-11 PROCEDURE — 99214 OFFICE O/P EST MOD 30 MIN: CPT

## 2024-03-11 PROCEDURE — 36415 COLL VENOUS BLD VENIPUNCTURE: CPT

## 2024-03-11 NOTE — REVIEW OF SYSTEMS
[Chills] : chills [Negative] : Psychiatric [Fever] : no fever [Fatigue] : no fatigue [Hot Flashes] : no hot flashes [Night Sweats] : no night sweats [Recent Change In Weight] : ~T no recent weight change

## 2024-03-11 NOTE — PHYSICAL EXAM
[Well Nourished] : well nourished [No Acute Distress] : no acute distress [Well Developed] : well developed [Well-Appearing] : well-appearing [Normal Voice/Communication] : normal voice/communication [Normal Sclera/Conjunctiva] : normal sclera/conjunctiva [PERRL] : pupils equal round and reactive to light [EOMI] : extraocular movements intact [Normal Outer Ear/Nose] : the outer ears and nose were normal in appearance [Normal Oropharynx] : the oropharynx was normal [No Lymphadenopathy] : no lymphadenopathy [No JVD] : no jugular venous distention [Supple] : supple [Thyroid Normal, No Nodules] : the thyroid was normal and there were no nodules present [No Respiratory Distress] : no respiratory distress  [No Accessory Muscle Use] : no accessory muscle use [Clear to Auscultation] : lungs were clear to auscultation bilaterally [Normal Rate] : normal rate  [Normal S1, S2] : normal S1 and S2 [Regular Rhythm] : with a regular rhythm [No Murmur] : no murmur heard [No Carotid Bruits] : no carotid bruits [No Abdominal Bruit] : a ~M bruit was not heard ~T in the abdomen [Pedal Pulses Present] : the pedal pulses are present [No Palpable Aorta] : no palpable aorta [No Edema] : there was no peripheral edema [Soft] : abdomen soft [Non Tender] : non-tender [Non-distended] : non-distended [No Masses] : no abdominal mass palpated [No HSM] : no HSM [Normal Bowel Sounds] : normal bowel sounds [Normal Posterior Cervical Nodes] : no posterior cervical lymphadenopathy [Normal Anterior Cervical Nodes] : no anterior cervical lymphadenopathy [No Spinal Tenderness] : no spinal tenderness [No CVA Tenderness] : no CVA  tenderness [No Joint Swelling] : no joint swelling [Grossly Normal Strength/Tone] : grossly normal strength/tone [Coordination Grossly Intact] : coordination grossly intact [No Focal Deficits] : no focal deficits [Normal Gait] : normal gait [Speech Grossly Normal] : speech grossly normal [Memory Grossly Normal] : memory grossly normal [Normal Affect] : the affect was normal [Alert and Oriented x3] : oriented to person, place, and time [Normal Mood] : the mood was normal [Normal Insight/Judgement] : insight and judgment were intact [de-identified] : No shoulder tenderness, from.   Lt knee with mild medical tenderness.  Neg draw signs, no pain with varrus/valgus stress.

## 2024-03-11 NOTE — HEALTH RISK ASSESSMENT
[No] : In the past 12 months have you used drugs other than those required for medical reasons? No [PHQ-2 Negative - No further assessment needed] : PHQ-2 Negative - No further assessment needed [0] : 2) Feeling down, depressed, or hopeless: Not at all (0) [Never] : Never [NRC0Asekt] : 0 [Audit-CScore] : 0

## 2024-03-11 NOTE — HISTORY OF PRESENT ILLNESS
[de-identified] : 54 y/o with a hx of gamma knife therapy for brain AVM, predm, microcytic anemia, low Ferritin.   [facility]  Recent notes and imaging reviewed.  Has been doing well.  f/u 1 year surveillance. Has been following with neurology. Has been following with cardiology - Dr Leyva.   Has been feeling well w/o issues. with lt knee and rt shoulder pains on and off for years.  No noted triggers.  no noted relieving or exacerbating factors.  ? worse in the winter.  no swelling, erythema, bruising.   with predm, microcytic anemia and low Ferritin on last labs. Has been feeling sl cold. Has been trying to be better with the diet.  follows with gyn. has had mammo with gyn - ? due for f/u colon - 2023 - polyps - f/u 2 years [FreeTextEntry1] : AVM, low Ferritin, microcytic anemia, AVm, predm knee and shoulder pain

## 2024-03-12 LAB
ALBUMIN SERPL ELPH-MCNC: 4.3 G/DL
ALP BLD-CCNC: 45 U/L
ALT SERPL-CCNC: 12 U/L
ANION GAP SERPL CALC-SCNC: 9 MMOL/L
AST SERPL-CCNC: 16 U/L
BASOPHILS # BLD AUTO: 0.04 K/UL
BASOPHILS NFR BLD AUTO: 0.7 %
BILIRUB SERPL-MCNC: 0.3 MG/DL
BUN SERPL-MCNC: 8 MG/DL
CALCIUM SERPL-MCNC: 9.5 MG/DL
CHLORIDE SERPL-SCNC: 104 MMOL/L
CO2 SERPL-SCNC: 28 MMOL/L
CREAT SERPL-MCNC: 1.28 MG/DL
EGFR: 50 ML/MIN/1.73M2
EOSINOPHIL # BLD AUTO: 0.23 K/UL
EOSINOPHIL NFR BLD AUTO: 4.1 %
ESTIMATED AVERAGE GLUCOSE: 126 MG/DL
FERRITIN SERPL-MCNC: 31 NG/ML
GLUCOSE SERPL-MCNC: 64 MG/DL
HBA1C MFR BLD HPLC: 6 %
HCT VFR BLD CALC: 36.4 %
HGB BLD-MCNC: 11 G/DL
IMM GRANULOCYTES NFR BLD AUTO: 0.4 %
IRON SATN MFR SERPL: 24 %
IRON SERPL-MCNC: 80 UG/DL
LYMPHOCYTES # BLD AUTO: 2.16 K/UL
LYMPHOCYTES NFR BLD AUTO: 38.1 %
MAN DIFF?: NORMAL
MCHC RBC-ENTMCNC: 22.3 PG
MCHC RBC-ENTMCNC: 30.2 GM/DL
MCV RBC AUTO: 73.7 FL
MONOCYTES # BLD AUTO: 0.48 K/UL
MONOCYTES NFR BLD AUTO: 8.5 %
NEUTROPHILS # BLD AUTO: 2.74 K/UL
NEUTROPHILS NFR BLD AUTO: 48.2 %
PLATELET # BLD AUTO: 263 K/UL
POTASSIUM SERPL-SCNC: 4.2 MMOL/L
PROT SERPL-MCNC: 7.3 G/DL
RBC # BLD: 4.94 M/UL
RBC # FLD: 17.2 %
SODIUM SERPL-SCNC: 141 MMOL/L
TIBC SERPL-MCNC: 328 UG/DL
UIBC SERPL-MCNC: 248 UG/DL
WBC # FLD AUTO: 5.67 K/UL

## 2024-03-13 ENCOUNTER — APPOINTMENT (OUTPATIENT)
Dept: ORTHOPEDIC SURGERY | Facility: CLINIC | Age: 54
End: 2024-03-13
Payer: COMMERCIAL

## 2024-03-13 VITALS — BODY MASS INDEX: 23.78 KG/M2 | HEIGHT: 66 IN | WEIGHT: 148 LBS

## 2024-03-13 DIAGNOSIS — M25.562 PAIN IN LEFT KNEE: ICD-10-CM

## 2024-03-13 PROCEDURE — 99203 OFFICE O/P NEW LOW 30 MIN: CPT

## 2024-03-13 PROCEDURE — 73564 X-RAY EXAM KNEE 4 OR MORE: CPT | Mod: LT

## 2024-03-15 ENCOUNTER — APPOINTMENT (OUTPATIENT)
Dept: ORTHOPEDIC SURGERY | Facility: CLINIC | Age: 54
End: 2024-03-15
Payer: COMMERCIAL

## 2024-03-15 VITALS — BODY MASS INDEX: 23.78 KG/M2 | HEIGHT: 66 IN | WEIGHT: 148 LBS

## 2024-03-15 DIAGNOSIS — M25.511 PAIN IN RIGHT SHOULDER: ICD-10-CM

## 2024-03-15 PROCEDURE — 73030 X-RAY EXAM OF SHOULDER: CPT | Mod: RT

## 2024-03-15 PROCEDURE — 99203 OFFICE O/P NEW LOW 30 MIN: CPT

## 2024-03-15 NOTE — DISCUSSION/SUMMARY
[de-identified] : Longstanding right shoulder pain for many years she has had extensive conservative care consisting of physical therapy exercises anti-inflammatory medications.  She has weakness objectively and subjectively.  We will obtain an MRI to further evaluate and she will follow-up after MRI

## 2024-03-15 NOTE — PHYSICAL EXAM
[de-identified] : General Exam  Well developed, well nourished  No apparent distress  Oriented to person, place, and time  Mood: Normal  Affect: Normal  Balance and coordination: Normal  Gait: Normal  Right shoulder exam   Inspection: No swelling, ecchymosis or gross deformity. Skin: No masses, No lesions  Tenderness: No bicipital tenderness, + tenderness to the greater tuberosity/RTC insertion, no anterior shoulder/lesser tuberosity tenderness. No tenderness SC joint, clavicle, AC joint. ROM: 160/60/T6 Impingement tests: Positive Tineo AC Joint: no pain with cross arm testing Biceps: Negative speed Strength: 5-/5 abduction, 5/5 external rotation, and internal rotation Neuro: AIN, PIN, Ulnar nerve motor intact Sensation: Intact to light touch in radial, median, ulnar, and axillary nerve distributions Vasc: 2+ radial pulse [de-identified] : The following radiographs were ordered and read by me during this patients visit. I reviewed each radiograph in detail with the patient and discussed the findings as highlighted below.  3 views right shoulder obtained today.  The glenohumeral joint is well-maintained normal alignment no fracture
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
## 2024-03-15 NOTE — DISCUSSION/SUMMARY
[de-identified] : 53-year-old female with chronic left knee pain for over 3 years.  She has intermittent swelling and stiffness suggesting intra-articular injury.  She does not have mechanical symptoms.  Her symptoms are likely secondary to mild degenerative arthritis and possible torn medial meniscus.  I recommended we obtain an MRI of the left knee to evaluate intra-articular pathology. If MRI shows a meniscal tear, consideration will be given to a surgical arthroscopy. In the interim, I recommended a course of PT and continuation of Tylenol and NSAIDS as needed. When results are available, we will discuss further.

## 2024-03-15 NOTE — HISTORY OF PRESENT ILLNESS
[de-identified] : 53-year-old female presents with 3 years of intermittent left knee pain.  She had a fall many years ago that may have aggravated the knee.  The pain is along the medial aspect, sharp in quality, associated with stiffness and intermittent swelling.  She denies buckling, locking, clicking.  The pain is increased with stairs and squatting.  Treatment has included NSAIDs and stretching exercises.  She also uses heat.  Denies pain radiating distally, denies numbness or tingling. PMH: Brain AV malformation with gamma knife surgery

## 2024-03-15 NOTE — PHYSICAL EXAM
[Normal] : Gait: normal [de-identified] : General: No acute distress Mental: Alert and oriented x3 Eyes: Conjunctivitis not seen Chest: Symmetric chest rise, no audible wheezing Skin: Bilateral lower extremities absent from rashes and ulcers Abdomen: No distention  Left knee: Skin: Clean, dry, intact  Inspection: No obvious malalignment, no masses, no swelling, no effusion.  Tenderness: Positive MJLT. no LJLT. No tenderness over the medial and lateral patella facets. No ttp medial/lateral epicondyle, patella tendon, tibial tubercle, pes anserinus, or proximal fibula.  ROM: 0 to 130  Stability: Stable to varus and valgus, negative lachman. Negative anterior/posterior drawer.  Additional tests: Positive McMurrays test, positive Steinmann, Negative patellar grind test.   Strength: 5/5 Q/H/TA/GS/EHL, no atrophy  Neuro: Sensation intact to light touch throughout in dp/sp/tib/rodri/saph nerve distributions Pulses: 2+ DP/PT pulses. [de-identified] : Left knee x-rays shows neutral alignment, mild narrowing of the medial and lateral joint space, no osteophytes, mild narrowing of the patellofemoral joint space.  Kellgren-Jose 1

## 2024-03-19 ENCOUNTER — OUTPATIENT (OUTPATIENT)
Dept: OUTPATIENT SERVICES | Facility: HOSPITAL | Age: 54
LOS: 1 days | End: 2024-03-19
Payer: COMMERCIAL

## 2024-03-19 ENCOUNTER — APPOINTMENT (OUTPATIENT)
Dept: MRI IMAGING | Facility: CLINIC | Age: 54
End: 2024-03-19
Payer: COMMERCIAL

## 2024-03-19 DIAGNOSIS — Z98.890 OTHER SPECIFIED POSTPROCEDURAL STATES: Chronic | ICD-10-CM

## 2024-03-19 DIAGNOSIS — M25.562 PAIN IN LEFT KNEE: ICD-10-CM

## 2024-03-19 PROCEDURE — 73721 MRI JNT OF LWR EXTRE W/O DYE: CPT

## 2024-03-19 PROCEDURE — 73721 MRI JNT OF LWR EXTRE W/O DYE: CPT | Mod: 26,LT

## 2024-03-21 ENCOUNTER — NON-APPOINTMENT (OUTPATIENT)
Age: 54
End: 2024-03-21

## 2024-03-21 ENCOUNTER — APPOINTMENT (OUTPATIENT)
Dept: ORTHOPEDIC SURGERY | Facility: CLINIC | Age: 54
End: 2024-03-21
Payer: COMMERCIAL

## 2024-03-21 DIAGNOSIS — S83.242D OTHER TEAR OF MEDIAL MENISCUS, CURRENT INJURY, LEFT KNEE, SUBSEQUENT ENCOUNTER: ICD-10-CM

## 2024-03-21 PROCEDURE — 99441: CPT

## 2024-03-26 ENCOUNTER — APPOINTMENT (OUTPATIENT)
Dept: MRI IMAGING | Facility: CLINIC | Age: 54
End: 2024-03-26

## 2024-06-06 ENCOUNTER — NON-APPOINTMENT (OUTPATIENT)
Age: 54
End: 2024-06-06

## 2024-06-06 ENCOUNTER — APPOINTMENT (OUTPATIENT)
Dept: CARDIOLOGY | Facility: CLINIC | Age: 54
End: 2024-06-06
Payer: COMMERCIAL

## 2024-06-06 VITALS
HEIGHT: 66 IN | OXYGEN SATURATION: 98 % | WEIGHT: 141 LBS | SYSTOLIC BLOOD PRESSURE: 96 MMHG | HEART RATE: 83 BPM | DIASTOLIC BLOOD PRESSURE: 60 MMHG | BODY MASS INDEX: 22.66 KG/M2

## 2024-06-06 DIAGNOSIS — R94.31 ABNORMAL ELECTROCARDIOGRAM [ECG] [EKG]: ICD-10-CM

## 2024-06-06 DIAGNOSIS — R07.89 OTHER CHEST PAIN: ICD-10-CM

## 2024-06-06 DIAGNOSIS — R73.03 PREDIABETES.: ICD-10-CM

## 2024-06-06 PROCEDURE — G2211 COMPLEX E/M VISIT ADD ON: CPT

## 2024-06-06 PROCEDURE — 93000 ELECTROCARDIOGRAM COMPLETE: CPT

## 2024-06-06 PROCEDURE — 99214 OFFICE O/P EST MOD 30 MIN: CPT

## 2024-06-06 NOTE — DISCUSSION/SUMMARY
[FreeTextEntry1] : I reviewed her recent echo and stress testing which are within acceptable limits.  Her symptoms are atypical and noncardiac in origin.  Heart healthy diet and lifestyle encouraged.  Sodium and starch restriction emphasized.  Follow-up with you for care and see us in about 12 months or sooner if needed. [EKG obtained to assist in diagnosis and management of assessed problem(s)] : EKG obtained to assist in diagnosis and management of assessed problem(s)

## 2024-06-07 ENCOUNTER — APPOINTMENT (OUTPATIENT)
Dept: OBGYN | Facility: CLINIC | Age: 54
End: 2024-06-07
Payer: COMMERCIAL

## 2024-06-07 VITALS
HEIGHT: 66.5 IN | WEIGHT: 140 LBS | DIASTOLIC BLOOD PRESSURE: 60 MMHG | SYSTOLIC BLOOD PRESSURE: 90 MMHG | BODY MASS INDEX: 22.23 KG/M2

## 2024-06-07 DIAGNOSIS — Z82.49 FAMILY HISTORY OF ISCHEMIC HEART DISEASE AND OTHER DISEASES OF THE CIRCULATORY SYSTEM: ICD-10-CM

## 2024-06-07 DIAGNOSIS — R23.2 FLUSHING: ICD-10-CM

## 2024-06-07 DIAGNOSIS — Z01.419 ENCOUNTER FOR GYNECOLOGICAL EXAMINATION (GENERAL) (ROUTINE) W/OUT ABNORMAL FINDINGS: ICD-10-CM

## 2024-06-07 DIAGNOSIS — Z11.3 ENCOUNTER FOR SCREENING FOR INFECTIONS WITH A PREDOMINANTLY SEXUAL MODE OF TRANSMISSION: ICD-10-CM

## 2024-06-07 PROCEDURE — 36415 COLL VENOUS BLD VENIPUNCTURE: CPT

## 2024-06-07 PROCEDURE — 99386 PREV VISIT NEW AGE 40-64: CPT

## 2024-06-07 NOTE — REVIEW OF SYSTEMS
[Fatigue] : fatigue [Night Sweats] : night sweats [Cough] : cough [Urgency] : urgency [Genital Rash/Irritation] : genital rash/irritation [Joint Stiffness] : joint stiffness [Back Pain] : back pain [Headache] : headache [Hot Flashes] : hot flashes [History of Anemia] : history of anemia [Negative] : Heme/Lymph

## 2024-06-07 NOTE — HISTORY OF PRESENT ILLNESS
[PGxTotal] : 1 [[facility]] : 1 [PGHxPremature] : 0 [PGHxAbortions] : 0 [[facility] Hospital[facility]] : 1 [FreeTextEntry1] :  1992 femle [Regular Cycle Intervals] : periods have been regular [Frequency: Q ___ days] : menstrual periods occur approximately every [unfilled] days [Menarche Age: ____] : age at menarche was [unfilled] [Currently Active] : currently active [Men] : men [Vaginal] : vaginal

## 2024-06-07 NOTE — PHYSICAL EXAM
[Chaperone Present] : A chaperone was present in the examining room during all aspects of the physical examination [[id number]] : A chaperone was present during the pelvic exam. [FreeTextEntry2] : Etta Macias [Appropriately responsive] : appropriately responsive [Alert] : alert [No Acute Distress] : no acute distress [No Lymphadenopathy] : no lymphadenopathy [Soft] : soft [Non-tender] : non-tender [Non-distended] : non-distended [No HSM] : No HSM [No Lesions] : no lesions [No Mass] : no mass [Oriented x3] : oriented x3 [Examination Of The Breasts] : a normal appearance [No Masses] : no breast masses were palpable [Labia Majora] : normal [Labia Minora] : normal [Normal] : normal [Uterine Adnexae] : normal [Declined] : Patient declined rectal exam

## 2024-06-10 LAB
ALBUMIN SERPL ELPH-MCNC: 4.4 G/DL
ALP BLD-CCNC: 45 U/L
ALT SERPL-CCNC: 11 U/L
ANION GAP SERPL CALC-SCNC: 12 MMOL/L
AST SERPL-CCNC: 17 U/L
BACTERIA UR CULT: NORMAL
BASOPHILS # BLD AUTO: 0.04 K/UL
BASOPHILS NFR BLD AUTO: 0.8 %
BILIRUB SERPL-MCNC: 0.3 MG/DL
BUN SERPL-MCNC: 9 MG/DL
CALCIUM SERPL-MCNC: 9.5 MG/DL
CHLORIDE SERPL-SCNC: 108 MMOL/L
CO2 SERPL-SCNC: 25 MMOL/L
CREAT SERPL-MCNC: 1.27 MG/DL
DHEA-S SERPL-MCNC: 59.2 UG/DL
EGFR: 50 ML/MIN/1.73M2
EOSINOPHIL # BLD AUTO: 0.13 K/UL
EOSINOPHIL NFR BLD AUTO: 2.5 %
ESTIMATED AVERAGE GLUCOSE: 128 MG/DL
ESTRADIOL SERPL-MCNC: 62 PG/ML
FSH SERPL-MCNC: 31.1 IU/L
GLUCOSE SERPL-MCNC: 71 MG/DL
HBA1C MFR BLD HPLC: 6.1 %
HBV SURFACE AG SER QL: NONREACTIVE
HCT VFR BLD CALC: 40.6 %
HCV AB SER QL: NONREACTIVE
HCV S/CO RATIO: 0.15 S/CO
HGB BLD-MCNC: 12.1 G/DL
HIV1+2 AB SPEC QL IA.RAPID: NONREACTIVE
IMM GRANULOCYTES NFR BLD AUTO: 0.2 %
INSULIN SERPL-MCNC: 28.6 UU/ML
LH SERPL-ACNC: 55.5 IU/L
LYMPHOCYTES # BLD AUTO: 1.86 K/UL
LYMPHOCYTES NFR BLD AUTO: 36.5 %
MAN DIFF?: NORMAL
MCHC RBC-ENTMCNC: 22.7 PG
MCHC RBC-ENTMCNC: 29.8 GM/DL
MCV RBC AUTO: 76.2 FL
MONOCYTES # BLD AUTO: 0.34 K/UL
MONOCYTES NFR BLD AUTO: 6.7 %
NEUTROPHILS # BLD AUTO: 2.72 K/UL
NEUTROPHILS NFR BLD AUTO: 53.3 %
PLATELET # BLD AUTO: 245 K/UL
POTASSIUM SERPL-SCNC: 4.1 MMOL/L
PROGEST SERPL-MCNC: 0.4 NG/ML
PROLACTIN SERPL-MCNC: 15.1 NG/ML
PROT SERPL-MCNC: 7.6 G/DL
RBC # BLD: 5.33 M/UL
RBC # FLD: 16.3 %
SODIUM SERPL-SCNC: 144 MMOL/L
T PALLIDUM AB SER QL IA: NEGATIVE
T4 FREE SERPL-MCNC: 1 NG/DL
TESTOST SERPL-MCNC: <2.5 NG/DL
TSH SERPL-ACNC: 2.44 UIU/ML
WBC # FLD AUTO: 5.1 K/UL

## 2024-06-11 ENCOUNTER — NON-APPOINTMENT (OUTPATIENT)
Age: 54
End: 2024-06-11

## 2024-06-11 LAB
C TRACH RRNA SPEC QL NAA+PROBE: NOT DETECTED
HPV HIGH+LOW RISK DNA PNL CVX: NOT DETECTED
N GONORRHOEA RRNA SPEC QL NAA+PROBE: NOT DETECTED
SOURCE AMPLIFICATION: NORMAL
SOURCE TP AMPLIFICATION: NORMAL
T VAGINALIS RRNA SPEC QL NAA+PROBE: NOT DETECTED

## 2024-06-13 LAB — CYTOLOGY CVX/VAG DOC THIN PREP: NORMAL

## 2024-06-28 ENCOUNTER — APPOINTMENT (OUTPATIENT)
Dept: OBGYN | Facility: CLINIC | Age: 54
End: 2024-06-28
Payer: COMMERCIAL

## 2024-06-28 VITALS
WEIGHT: 143 LBS | BODY MASS INDEX: 22.71 KG/M2 | DIASTOLIC BLOOD PRESSURE: 62 MMHG | SYSTOLIC BLOOD PRESSURE: 100 MMHG | HEIGHT: 66.5 IN

## 2024-06-28 DIAGNOSIS — D25.9 LEIOMYOMA OF UTERUS, UNSPECIFIED: ICD-10-CM

## 2024-06-28 DIAGNOSIS — N95.1 MENOPAUSAL AND FEMALE CLIMACTERIC STATES: ICD-10-CM

## 2024-06-28 DIAGNOSIS — N91.5 OLIGOMENORRHEA, UNSPECIFIED: ICD-10-CM

## 2024-06-28 PROCEDURE — 76830 TRANSVAGINAL US NON-OB: CPT

## 2024-07-02 RX ORDER — LORAZEPAM 0.5 MG/1
0.5 TABLET ORAL
Qty: 2 | Refills: 0 | Status: ACTIVE | COMMUNITY
Start: 2024-07-02 | End: 1900-01-01

## 2024-07-03 ENCOUNTER — APPOINTMENT (OUTPATIENT)
Dept: MRI IMAGING | Facility: CLINIC | Age: 54
End: 2024-07-03
Payer: COMMERCIAL

## 2024-07-03 ENCOUNTER — OUTPATIENT (OUTPATIENT)
Dept: OUTPATIENT SERVICES | Facility: HOSPITAL | Age: 54
LOS: 1 days | End: 2024-07-03
Payer: COMMERCIAL

## 2024-07-03 DIAGNOSIS — Z98.890 OTHER SPECIFIED POSTPROCEDURAL STATES: Chronic | ICD-10-CM

## 2024-07-03 DIAGNOSIS — Q28.2 ARTERIOVENOUS MALFORMATION OF CEREBRAL VESSELS: ICD-10-CM

## 2024-07-03 PROCEDURE — 70545 MR ANGIOGRAPHY HEAD W/DYE: CPT | Mod: 26,59

## 2024-07-03 PROCEDURE — 70545 MR ANGIOGRAPHY HEAD W/DYE: CPT

## 2024-07-03 PROCEDURE — 70553 MRI BRAIN STEM W/O & W/DYE: CPT

## 2024-07-03 PROCEDURE — 70553 MRI BRAIN STEM W/O & W/DYE: CPT | Mod: 26

## 2024-07-03 PROCEDURE — A9585: CPT

## 2024-07-09 ENCOUNTER — APPOINTMENT (OUTPATIENT)
Dept: NEUROSURGERY | Facility: CLINIC | Age: 54
End: 2024-07-09
Payer: COMMERCIAL

## 2024-07-09 VITALS
WEIGHT: 143 LBS | BODY MASS INDEX: 22.71 KG/M2 | SYSTOLIC BLOOD PRESSURE: 101 MMHG | HEIGHT: 66.5 IN | DIASTOLIC BLOOD PRESSURE: 68 MMHG | OXYGEN SATURATION: 96 % | HEART RATE: 87 BPM

## 2024-07-09 PROCEDURE — 99214 OFFICE O/P EST MOD 30 MIN: CPT

## 2024-07-10 ENCOUNTER — APPOINTMENT (OUTPATIENT)
Dept: RADIATION ONCOLOGY | Facility: CLINIC | Age: 54
End: 2024-07-10
Payer: COMMERCIAL

## 2024-07-10 ENCOUNTER — NON-APPOINTMENT (OUTPATIENT)
Age: 54
End: 2024-07-10

## 2024-07-10 VITALS
WEIGHT: 143.08 LBS | BODY MASS INDEX: 22.99 KG/M2 | DIASTOLIC BLOOD PRESSURE: 78 MMHG | TEMPERATURE: 98.6 F | SYSTOLIC BLOOD PRESSURE: 116 MMHG | HEART RATE: 94 BPM | RESPIRATION RATE: 17 BRPM | OXYGEN SATURATION: 100 % | HEIGHT: 66 IN

## 2024-07-10 DIAGNOSIS — Q28.2 ARTERIOVENOUS MALFORMATION OF CEREBRAL VESSELS: ICD-10-CM

## 2024-07-10 PROCEDURE — 99212 OFFICE O/P EST SF 10 MIN: CPT

## 2024-07-16 ENCOUNTER — APPOINTMENT (OUTPATIENT)
Dept: OBGYN | Facility: CLINIC | Age: 54
End: 2024-07-16
Payer: COMMERCIAL

## 2024-07-16 VITALS
WEIGHT: 143 LBS | SYSTOLIC BLOOD PRESSURE: 96 MMHG | DIASTOLIC BLOOD PRESSURE: 56 MMHG | BODY MASS INDEX: 22.98 KG/M2 | HEIGHT: 66 IN

## 2024-07-16 DIAGNOSIS — B37.31 ACUTE CANDIDIASIS OF VULVA AND VAGINA: ICD-10-CM

## 2024-07-16 PROCEDURE — 99213 OFFICE O/P EST LOW 20 MIN: CPT | Mod: 25

## 2024-07-16 PROCEDURE — 99459 PELVIC EXAMINATION: CPT

## 2024-07-16 RX ORDER — FLUCONAZOLE 150 MG/1
150 TABLET ORAL
Qty: 2 | Refills: 0 | Status: ACTIVE | COMMUNITY
Start: 2024-07-16 | End: 1900-01-01

## 2024-07-16 RX ORDER — TERCONAZOLE 8 MG/G
0.8 CREAM VAGINAL
Qty: 1 | Refills: 0 | Status: ACTIVE | COMMUNITY
Start: 2024-07-16 | End: 1900-01-01

## 2024-07-17 LAB
CANDIDA VAG CYTO: NOT DETECTED
G VAGINALIS+PREV SP MTYP VAG QL MICRO: NOT DETECTED
T VAGINALIS VAG QL WET PREP: NOT DETECTED

## 2024-09-12 ENCOUNTER — APPOINTMENT (OUTPATIENT)
Dept: INTERNAL MEDICINE | Facility: CLINIC | Age: 54
End: 2024-09-12

## 2024-09-12 VITALS
HEIGHT: 66 IN | WEIGHT: 142 LBS | HEART RATE: 78 BPM | OXYGEN SATURATION: 98 % | BODY MASS INDEX: 22.82 KG/M2 | RESPIRATION RATE: 16 BRPM | DIASTOLIC BLOOD PRESSURE: 72 MMHG | SYSTOLIC BLOOD PRESSURE: 108 MMHG

## 2024-09-12 DIAGNOSIS — R79.0 ABNORMAL LVL OF BLOOD MINERAL: ICD-10-CM

## 2024-09-12 DIAGNOSIS — Z23 ENCOUNTER FOR IMMUNIZATION: ICD-10-CM

## 2024-09-12 DIAGNOSIS — R73.03 PREDIABETES.: ICD-10-CM

## 2024-09-12 DIAGNOSIS — L98.9 DISORDER OF THE SKIN AND SUBCUTANEOUS TISSUE, UNSPECIFIED: ICD-10-CM

## 2024-09-12 DIAGNOSIS — D50.9 IRON DEFICIENCY ANEMIA, UNSPECIFIED: ICD-10-CM

## 2024-09-12 DIAGNOSIS — Q28.2 ARTERIOVENOUS MALFORMATION OF CEREBRAL VESSELS: ICD-10-CM

## 2024-09-12 DIAGNOSIS — M25.562 PAIN IN LEFT KNEE: ICD-10-CM

## 2024-09-12 DIAGNOSIS — M25.511 PAIN IN RIGHT SHOULDER: ICD-10-CM

## 2024-09-12 PROCEDURE — G0008: CPT

## 2024-09-12 PROCEDURE — G2211 COMPLEX E/M VISIT ADD ON: CPT | Mod: NC

## 2024-09-12 PROCEDURE — 99214 OFFICE O/P EST MOD 30 MIN: CPT | Mod: 25

## 2024-09-12 PROCEDURE — 90656 IIV3 VACC NO PRSV 0.5 ML IM: CPT

## 2024-09-12 NOTE — PHYSICAL EXAM
[No Acute Distress] : no acute distress [Well Nourished] : well nourished [Well Developed] : well developed [Well-Appearing] : well-appearing [Normal Voice/Communication] : normal voice/communication [Normal Sclera/Conjunctiva] : normal sclera/conjunctiva [PERRL] : pupils equal round and reactive to light [EOMI] : extraocular movements intact [Normal Outer Ear/Nose] : the outer ears and nose were normal in appearance [Normal Oropharynx] : the oropharynx was normal [No JVD] : no jugular venous distention [No Lymphadenopathy] : no lymphadenopathy [Supple] : supple [Thyroid Normal, No Nodules] : the thyroid was normal and there were no nodules present [No Respiratory Distress] : no respiratory distress  [No Accessory Muscle Use] : no accessory muscle use [Clear to Auscultation] : lungs were clear to auscultation bilaterally [Normal Rate] : normal rate  [Regular Rhythm] : with a regular rhythm [Normal S1, S2] : normal S1 and S2 [No Murmur] : no murmur heard [No Carotid Bruits] : no carotid bruits [No Abdominal Bruit] : a ~M bruit was not heard ~T in the abdomen [Pedal Pulses Present] : the pedal pulses are present [No Edema] : there was no peripheral edema [No Palpable Aorta] : no palpable aorta [Soft] : abdomen soft [Non Tender] : non-tender [Non-distended] : non-distended [No Masses] : no abdominal mass palpated [No HSM] : no HSM [Normal Bowel Sounds] : normal bowel sounds [Normal Posterior Cervical Nodes] : no posterior cervical lymphadenopathy [Normal Anterior Cervical Nodes] : no anterior cervical lymphadenopathy [No CVA Tenderness] : no CVA  tenderness [No Spinal Tenderness] : no spinal tenderness [No Joint Swelling] : no joint swelling [Grossly Normal Strength/Tone] : grossly normal strength/tone [Coordination Grossly Intact] : coordination grossly intact [No Focal Deficits] : no focal deficits [Normal Gait] : normal gait [Speech Grossly Normal] : speech grossly normal [Memory Grossly Normal] : memory grossly normal [Normal Affect] : the affect was normal [Alert and Oriented x3] : oriented to person, place, and time [Normal Mood] : the mood was normal [Normal Insight/Judgement] : insight and judgment were intact [de-identified] : scattered hyperpig papulomacular lesions

## 2024-09-12 NOTE — HISTORY OF PRESENT ILLNESS
[FreeTextEntry1] : AVM, low Ferritin, microcytic anemia, AVm, predm knee and shoulder pain [de-identified] : 53 y/o with a hx of gamma knife therapy for brain AVM, predm, microcytic anemia, low Ferritin.   [facility]  Recent notes and imaging reviewed.  Has been doing well.  f/u 1 year surveillance. Has been following with neurology. Has been following with cardiology - Dr Leyva - now Dr Peraza - note reviewed [facility] for the knee and the shoulder.  Notes and imaging reviewed.  with tear of the medial meniscus and arthritis. Started on conservative rx, PT for the knee.  did not get the MRI imaging of the shoulder yet. Has been feeling well w/o issues. with predm, microcytic anemia and low Ferritin on labs.  Had f/u labs - last with gyn in June - h/h improved.  still microcytosis.  A1c stable.  TSH nl.  iron studies were nl in March Has been feeling sl cold. Has been trying to be better with the diet. Asking to see derm for skin lesions that come out.    follows with gyn - annual 6/24 has had mammo with gyn - ? due for f/u colon - 2023 - polyps - f/u 2 years

## 2024-09-12 NOTE — REVIEW OF SYSTEMS
[Chills] : chills [Negative] : Heme/Lymph [Fever] : no fever [Fatigue] : no fatigue [Hot Flashes] : no hot flashes [Night Sweats] : no night sweats [Recent Change In Weight] : ~T no recent weight change [de-identified] : as per HPI

## 2024-09-12 NOTE — ASSESSMENT
[FreeTextEntry1] : Recent follow-up visit notes and labs were reviewed and discussed with the patient.  Answered all her questions.

## 2024-09-12 NOTE — HEALTH RISK ASSESSMENT
[No] : In the past 12 months have you used drugs other than those required for medical reasons? No [0] : 2) Feeling down, depressed, or hopeless: Not at all (0) [PHQ-2 Negative - No further assessment needed] : PHQ-2 Negative - No further assessment needed [Never] : Never [Audit-CScore] : 0 [[id number]] : 0

## 2024-12-31 ENCOUNTER — APPOINTMENT (OUTPATIENT)
Dept: INTERNAL MEDICINE | Facility: CLINIC | Age: 54
End: 2024-12-31
Payer: COMMERCIAL

## 2024-12-31 VITALS
HEART RATE: 69 BPM | BODY MASS INDEX: 22.5 KG/M2 | TEMPERATURE: 98.2 F | DIASTOLIC BLOOD PRESSURE: 68 MMHG | RESPIRATION RATE: 16 BRPM | OXYGEN SATURATION: 98 % | WEIGHT: 140 LBS | HEIGHT: 66 IN | SYSTOLIC BLOOD PRESSURE: 110 MMHG

## 2024-12-31 DIAGNOSIS — D25.9 LEIOMYOMA OF UTERUS, UNSPECIFIED: ICD-10-CM

## 2024-12-31 DIAGNOSIS — D50.9 IRON DEFICIENCY ANEMIA, UNSPECIFIED: ICD-10-CM

## 2024-12-31 DIAGNOSIS — M25.562 PAIN IN LEFT KNEE: ICD-10-CM

## 2024-12-31 DIAGNOSIS — R73.03 PREDIABETES.: ICD-10-CM

## 2024-12-31 DIAGNOSIS — R79.0 ABNORMAL LVL OF BLOOD MINERAL: ICD-10-CM

## 2024-12-31 DIAGNOSIS — Q28.2 ARTERIOVENOUS MALFORMATION OF CEREBRAL VESSELS: ICD-10-CM

## 2024-12-31 DIAGNOSIS — L98.9 DISORDER OF THE SKIN AND SUBCUTANEOUS TISSUE, UNSPECIFIED: ICD-10-CM

## 2024-12-31 DIAGNOSIS — M25.511 PAIN IN RIGHT SHOULDER: ICD-10-CM

## 2024-12-31 PROCEDURE — 99214 OFFICE O/P EST MOD 30 MIN: CPT

## 2024-12-31 PROCEDURE — 36415 COLL VENOUS BLD VENIPUNCTURE: CPT

## 2024-12-31 PROCEDURE — G2211 COMPLEX E/M VISIT ADD ON: CPT | Mod: NC

## 2025-01-01 LAB
ALBUMIN SERPL ELPH-MCNC: 4.5 G/DL
ALP BLD-CCNC: 48 U/L
ALT SERPL-CCNC: 15 U/L
ANION GAP SERPL CALC-SCNC: 10 MMOL/L
AST SERPL-CCNC: 22 U/L
BASOPHILS # BLD AUTO: 0.06 K/UL
BASOPHILS NFR BLD AUTO: 1 %
BILIRUB SERPL-MCNC: 0.4 MG/DL
BUN SERPL-MCNC: 13 MG/DL
CALCIUM SERPL-MCNC: 9.3 MG/DL
CHLORIDE SERPL-SCNC: 105 MMOL/L
CO2 SERPL-SCNC: 25 MMOL/L
CREAT SERPL-MCNC: 1.27 MG/DL
EGFR: 50 ML/MIN/1.73M2
EOSINOPHIL # BLD AUTO: 0.21 K/UL
EOSINOPHIL NFR BLD AUTO: 3.3 %
ESTIMATED AVERAGE GLUCOSE: 131 MG/DL
FERRITIN SERPL-MCNC: 54 NG/ML
GLUCOSE SERPL-MCNC: 70 MG/DL
HBA1C MFR BLD HPLC: 6.2 %
HCT VFR BLD CALC: 38.3 %
HGB BLD-MCNC: 11.4 G/DL
IMM GRANULOCYTES NFR BLD AUTO: 0.2 %
IRON SATN MFR SERPL: 30 %
IRON SERPL-MCNC: 100 UG/DL
LYMPHOCYTES # BLD AUTO: 2.62 K/UL
LYMPHOCYTES NFR BLD AUTO: 41.8 %
MAN DIFF?: NORMAL
MCHC RBC-ENTMCNC: 22.4 PG
MCHC RBC-ENTMCNC: 29.8 G/DL
MCV RBC AUTO: 75.2 FL
MONOCYTES # BLD AUTO: 0.53 K/UL
MONOCYTES NFR BLD AUTO: 8.5 %
NEUTROPHILS # BLD AUTO: 2.84 K/UL
NEUTROPHILS NFR BLD AUTO: 45.2 %
PLATELET # BLD AUTO: 286 K/UL
POTASSIUM SERPL-SCNC: 4.4 MMOL/L
PROT SERPL-MCNC: 7.6 G/DL
RBC # BLD: 5.09 M/UL
RBC # FLD: 16.5 %
SODIUM SERPL-SCNC: 140 MMOL/L
TIBC SERPL-MCNC: 338 UG/DL
UIBC SERPL-MCNC: 238 UG/DL
WBC # FLD AUTO: 6.27 K/UL

## 2025-04-24 NOTE — ED ADULT TRIAGE NOTE - CCCP TRG CHIEF CMPLNT
r shoulder injury Duration Of Freeze Thaw-Cycle (Seconds): 5 Show Applicator Variable?: Yes Number Of Freeze-Thaw Cycles: 1 freeze-thaw cycle Render Post-Care Instructions In Note?: no Detail Level: Detailed Post-Care Instructions: I reviewed with the patient in detail post-care instructions. Patient is to wear sunprotection, and avoid picking at any of the treated lesions. Pt may apply Vaseline to crusted or scabbing areas. Consent: The patient's consent was obtained including but not limited to risks of crusting, scabbing, blistering, scarring, darker or lighter pigmentary change, recurrence, incomplete removal and infection.

## 2025-06-05 ENCOUNTER — APPOINTMENT (OUTPATIENT)
Dept: DERMATOLOGY | Facility: CLINIC | Age: 55
End: 2025-06-05
Payer: COMMERCIAL

## 2025-06-05 ENCOUNTER — APPOINTMENT (OUTPATIENT)
Dept: INTERNAL MEDICINE | Facility: CLINIC | Age: 55
End: 2025-06-05
Payer: COMMERCIAL

## 2025-06-05 VITALS
DIASTOLIC BLOOD PRESSURE: 60 MMHG | BODY MASS INDEX: 23.46 KG/M2 | WEIGHT: 146 LBS | HEIGHT: 66 IN | HEART RATE: 78 BPM | OXYGEN SATURATION: 98 % | RESPIRATION RATE: 16 BRPM | SYSTOLIC BLOOD PRESSURE: 106 MMHG

## 2025-06-05 DIAGNOSIS — Z12.39 ENCOUNTER FOR OTHER SCREENING FOR MALIGNANT NEOPLASM OF BREAST: ICD-10-CM

## 2025-06-05 DIAGNOSIS — M25.511 PAIN IN RIGHT SHOULDER: ICD-10-CM

## 2025-06-05 DIAGNOSIS — Q28.2 ARTERIOVENOUS MALFORMATION OF CEREBRAL VESSELS: ICD-10-CM

## 2025-06-05 DIAGNOSIS — D50.9 IRON DEFICIENCY ANEMIA, UNSPECIFIED: ICD-10-CM

## 2025-06-05 DIAGNOSIS — D25.9 LEIOMYOMA OF UTERUS, UNSPECIFIED: ICD-10-CM

## 2025-06-05 DIAGNOSIS — L98.9 DISORDER OF THE SKIN AND SUBCUTANEOUS TISSUE, UNSPECIFIED: ICD-10-CM

## 2025-06-05 DIAGNOSIS — L30.9 DERMATITIS, UNSPECIFIED: ICD-10-CM

## 2025-06-05 DIAGNOSIS — L81.0 POSTINFLAMMATORY HYPERPIGMENTATION: ICD-10-CM

## 2025-06-05 DIAGNOSIS — R73.03 PREDIABETES.: ICD-10-CM

## 2025-06-05 DIAGNOSIS — M25.562 PAIN IN LEFT KNEE: ICD-10-CM

## 2025-06-05 DIAGNOSIS — R79.0 ABNORMAL LVL OF BLOOD MINERAL: ICD-10-CM

## 2025-06-05 PROBLEM — M25.561 RIGHT KNEE PAIN: Status: ACTIVE | Noted: 2025-06-05

## 2025-06-05 PROCEDURE — 99214 OFFICE O/P EST MOD 30 MIN: CPT

## 2025-06-05 PROCEDURE — 99203 OFFICE O/P NEW LOW 30 MIN: CPT

## 2025-06-05 PROCEDURE — G2211 COMPLEX E/M VISIT ADD ON: CPT | Mod: NC

## 2025-06-05 PROCEDURE — 36415 COLL VENOUS BLD VENIPUNCTURE: CPT

## 2025-06-06 ENCOUNTER — APPOINTMENT (OUTPATIENT)
Dept: CARDIOLOGY | Facility: CLINIC | Age: 55
End: 2025-06-06
Payer: COMMERCIAL

## 2025-06-06 ENCOUNTER — NON-APPOINTMENT (OUTPATIENT)
Age: 55
End: 2025-06-06

## 2025-06-06 VITALS
WEIGHT: 146 LBS | OXYGEN SATURATION: 98 % | SYSTOLIC BLOOD PRESSURE: 118 MMHG | DIASTOLIC BLOOD PRESSURE: 70 MMHG | RESPIRATION RATE: 16 BRPM | HEIGHT: 66 IN | HEART RATE: 74 BPM | BODY MASS INDEX: 23.46 KG/M2

## 2025-06-06 LAB
ALBUMIN SERPL ELPH-MCNC: 4.2 G/DL
ALP BLD-CCNC: 59 U/L
ALT SERPL-CCNC: 24 U/L
ANION GAP SERPL CALC-SCNC: 14 MMOL/L
AST SERPL-CCNC: 23 U/L
BASOPHILS # BLD AUTO: 0.04 K/UL
BASOPHILS NFR BLD AUTO: 0.7 %
BILIRUB SERPL-MCNC: 0.3 MG/DL
BUN SERPL-MCNC: 10 MG/DL
CALCIUM SERPL-MCNC: 9.7 MG/DL
CHLORIDE SERPL-SCNC: 104 MMOL/L
CHOLEST SERPL-MCNC: 192 MG/DL
CO2 SERPL-SCNC: 23 MMOL/L
CREAT SERPL-MCNC: 1.26 MG/DL
EGFRCR SERPLBLD CKD-EPI 2021: 50 ML/MIN/1.73M2
EOSINOPHIL # BLD AUTO: 0.19 K/UL
EOSINOPHIL NFR BLD AUTO: 3.1 %
ESTIMATED AVERAGE GLUCOSE: 134 MG/DL
FERRITIN SERPL-MCNC: 48 NG/ML
FOLATE SERPL-MCNC: 9.4 NG/ML
GLUCOSE SERPL-MCNC: 95 MG/DL
HBA1C MFR BLD HPLC: 6.3 %
HCT VFR BLD CALC: 41.3 %
HDLC SERPL-MCNC: 74 MG/DL
HGB BLD-MCNC: 12.1 G/DL
IMM GRANULOCYTES NFR BLD AUTO: 0.3 %
IRON SATN MFR SERPL: 31 %
IRON SERPL-MCNC: 99 UG/DL
LDLC SERPL-MCNC: 101 MG/DL
LYMPHOCYTES # BLD AUTO: 1.88 K/UL
LYMPHOCYTES NFR BLD AUTO: 31.1 %
MAN DIFF?: NORMAL
MCHC RBC-ENTMCNC: 22.7 PG
MCHC RBC-ENTMCNC: 29.3 G/DL
MCV RBC AUTO: 77.3 FL
MONOCYTES # BLD AUTO: 0.49 K/UL
MONOCYTES NFR BLD AUTO: 8.1 %
NEUTROPHILS # BLD AUTO: 3.42 K/UL
NEUTROPHILS NFR BLD AUTO: 56.7 %
NONHDLC SERPL-MCNC: 118 MG/DL
PLATELET # BLD AUTO: 265 K/UL
POTASSIUM SERPL-SCNC: 4.6 MMOL/L
PROT SERPL-MCNC: 7.4 G/DL
RBC # BLD: 5.34 M/UL
RBC # FLD: 18 %
SODIUM SERPL-SCNC: 141 MMOL/L
TIBC SERPL-MCNC: 317 UG/DL
TRIGL SERPL-MCNC: 91 MG/DL
TSH SERPL-ACNC: 2.28 UIU/ML
UIBC SERPL-MCNC: 218 UG/DL
VIT B12 SERPL-MCNC: 637 PG/ML
WBC # FLD AUTO: 6.04 K/UL

## 2025-06-06 PROCEDURE — 93000 ELECTROCARDIOGRAM COMPLETE: CPT

## 2025-06-06 PROCEDURE — 99214 OFFICE O/P EST MOD 30 MIN: CPT

## 2025-06-06 PROCEDURE — G2211 COMPLEX E/M VISIT ADD ON: CPT | Mod: NC

## 2025-06-06 RX ORDER — TRIAMCINOLONE ACETONIDE 1 MG/G
0.1 OINTMENT TOPICAL
Qty: 1 | Refills: 2 | Status: ACTIVE | COMMUNITY
Start: 2025-06-05

## 2025-06-10 ENCOUNTER — APPOINTMENT (OUTPATIENT)
Dept: ORTHOPEDIC SURGERY | Facility: CLINIC | Age: 55
End: 2025-06-10
Payer: COMMERCIAL

## 2025-06-10 VITALS — BODY MASS INDEX: 23.3 KG/M2 | HEIGHT: 66 IN | WEIGHT: 145 LBS

## 2025-06-10 PROCEDURE — 73564 X-RAY EXAM KNEE 4 OR MORE: CPT | Mod: RT

## 2025-06-10 PROCEDURE — 99213 OFFICE O/P EST LOW 20 MIN: CPT

## 2025-06-11 ENCOUNTER — OUTPATIENT (OUTPATIENT)
Dept: OUTPATIENT SERVICES | Facility: HOSPITAL | Age: 55
LOS: 1 days | End: 2025-06-11
Payer: COMMERCIAL

## 2025-06-11 ENCOUNTER — APPOINTMENT (OUTPATIENT)
Dept: ULTRASOUND IMAGING | Facility: CLINIC | Age: 55
End: 2025-06-11
Payer: COMMERCIAL

## 2025-06-11 ENCOUNTER — APPOINTMENT (OUTPATIENT)
Dept: MAMMOGRAPHY | Facility: CLINIC | Age: 55
End: 2025-06-11
Payer: COMMERCIAL

## 2025-06-11 ENCOUNTER — RESULT REVIEW (OUTPATIENT)
Age: 55
End: 2025-06-11

## 2025-06-11 DIAGNOSIS — Z98.890 OTHER SPECIFIED POSTPROCEDURAL STATES: Chronic | ICD-10-CM

## 2025-06-11 DIAGNOSIS — Z00.8 ENCOUNTER FOR OTHER GENERAL EXAMINATION: ICD-10-CM

## 2025-06-11 PROCEDURE — 77067 SCR MAMMO BI INCL CAD: CPT

## 2025-06-11 PROCEDURE — 77067 SCR MAMMO BI INCL CAD: CPT | Mod: 26

## 2025-06-11 PROCEDURE — 77063 BREAST TOMOSYNTHESIS BI: CPT

## 2025-06-11 PROCEDURE — 77063 BREAST TOMOSYNTHESIS BI: CPT | Mod: 26

## 2025-06-23 ENCOUNTER — APPOINTMENT (OUTPATIENT)
Dept: MRI IMAGING | Facility: CLINIC | Age: 55
End: 2025-06-23

## 2025-06-23 ENCOUNTER — OUTPATIENT (OUTPATIENT)
Dept: OUTPATIENT SERVICES | Facility: HOSPITAL | Age: 55
LOS: 1 days | End: 2025-06-23
Payer: COMMERCIAL

## 2025-06-23 DIAGNOSIS — Z98.890 OTHER SPECIFIED POSTPROCEDURAL STATES: Chronic | ICD-10-CM

## 2025-06-23 DIAGNOSIS — Z00.8 ENCOUNTER FOR OTHER GENERAL EXAMINATION: ICD-10-CM

## 2025-06-23 DIAGNOSIS — Q28.2 ARTERIOVENOUS MALFORMATION OF CEREBRAL VESSELS: ICD-10-CM

## 2025-06-23 PROCEDURE — 70546 MR ANGIOGRAPH HEAD W/O&W/DYE: CPT

## 2025-06-23 PROCEDURE — 70546 MR ANGIOGRAPH HEAD W/O&W/DYE: CPT | Mod: 26,59

## 2025-06-23 PROCEDURE — A9585: CPT

## 2025-06-23 PROCEDURE — 70553 MRI BRAIN STEM W/O & W/DYE: CPT | Mod: 26

## 2025-06-23 PROCEDURE — 70553 MRI BRAIN STEM W/O & W/DYE: CPT

## 2025-06-24 ENCOUNTER — APPOINTMENT (OUTPATIENT)
Dept: OBGYN | Facility: CLINIC | Age: 55
End: 2025-06-24
Payer: COMMERCIAL

## 2025-06-24 VITALS
WEIGHT: 148 LBS | SYSTOLIC BLOOD PRESSURE: 110 MMHG | HEIGHT: 66 IN | DIASTOLIC BLOOD PRESSURE: 72 MMHG | BODY MASS INDEX: 23.78 KG/M2

## 2025-06-24 PROBLEM — R92.30 DENSE BREASTS: Status: ACTIVE | Noted: 2025-06-24

## 2025-06-24 PROCEDURE — 99396 PREV VISIT EST AGE 40-64: CPT

## 2025-06-24 RX ORDER — ALPRAZOLAM 0.25 MG/1
0.25 TABLET ORAL
Qty: 2 | Refills: 0 | Status: DISCONTINUED | COMMUNITY
Start: 2025-06-20 | End: 2025-06-24

## 2025-06-25 ENCOUNTER — RESULT REVIEW (OUTPATIENT)
Age: 55
End: 2025-06-25

## 2025-06-25 ENCOUNTER — OUTPATIENT (OUTPATIENT)
Dept: OUTPATIENT SERVICES | Facility: HOSPITAL | Age: 55
LOS: 1 days | End: 2025-06-25
Payer: COMMERCIAL

## 2025-06-25 ENCOUNTER — APPOINTMENT (OUTPATIENT)
Dept: ULTRASOUND IMAGING | Facility: CLINIC | Age: 55
End: 2025-06-25
Payer: COMMERCIAL

## 2025-06-25 ENCOUNTER — APPOINTMENT (OUTPATIENT)
Dept: MAMMOGRAPHY | Facility: CLINIC | Age: 55
End: 2025-06-25
Payer: COMMERCIAL

## 2025-06-25 DIAGNOSIS — Z98.890 OTHER SPECIFIED POSTPROCEDURAL STATES: Chronic | ICD-10-CM

## 2025-06-25 DIAGNOSIS — Z00.8 ENCOUNTER FOR OTHER GENERAL EXAMINATION: ICD-10-CM

## 2025-06-25 LAB — HPV HIGH+LOW RISK DNA PNL CVX: NOT DETECTED

## 2025-06-25 PROCEDURE — G0279: CPT | Mod: 26

## 2025-06-25 PROCEDURE — 76641 ULTRASOUND BREAST COMPLETE: CPT | Mod: 26,50

## 2025-06-25 PROCEDURE — 77065 DX MAMMO INCL CAD UNI: CPT | Mod: 26,RT

## 2025-06-25 PROCEDURE — G0279: CPT

## 2025-06-25 PROCEDURE — 77065 DX MAMMO INCL CAD UNI: CPT

## 2025-06-25 PROCEDURE — 76641 ULTRASOUND BREAST COMPLETE: CPT

## 2025-06-26 ENCOUNTER — APPOINTMENT (OUTPATIENT)
Dept: CARDIOLOGY | Facility: CLINIC | Age: 55
End: 2025-06-26
Payer: COMMERCIAL

## 2025-06-26 PROCEDURE — 93306 TTE W/DOPPLER COMPLETE: CPT

## 2025-06-27 LAB — CYTOLOGY CVX/VAG DOC THIN PREP: NORMAL

## 2025-06-30 ENCOUNTER — NON-APPOINTMENT (OUTPATIENT)
Age: 55
End: 2025-06-30

## 2025-06-30 PROBLEM — R92.8 ABNORMAL FINDING ON BREAST IMAGING: Status: ACTIVE | Noted: 2025-06-30

## 2025-07-01 ENCOUNTER — APPOINTMENT (OUTPATIENT)
Dept: CARDIOLOGY | Facility: CLINIC | Age: 55
End: 2025-07-01
Payer: COMMERCIAL

## 2025-07-01 PROCEDURE — 93015 CV STRESS TEST SUPVJ I&R: CPT

## 2025-07-02 ENCOUNTER — NON-APPOINTMENT (OUTPATIENT)
Age: 55
End: 2025-07-02

## 2025-07-02 ENCOUNTER — APPOINTMENT (OUTPATIENT)
Dept: NEUROSURGERY | Facility: CLINIC | Age: 55
End: 2025-07-02
Payer: COMMERCIAL

## 2025-07-02 VITALS
SYSTOLIC BLOOD PRESSURE: 116 MMHG | WEIGHT: 148 LBS | HEART RATE: 74 BPM | DIASTOLIC BLOOD PRESSURE: 73 MMHG | RESPIRATION RATE: 16 BRPM | BODY MASS INDEX: 23.5 KG/M2 | OXYGEN SATURATION: 100 % | HEIGHT: 66.5 IN

## 2025-07-02 PROCEDURE — 99215 OFFICE O/P EST HI 40 MIN: CPT

## 2025-07-09 ENCOUNTER — APPOINTMENT (OUTPATIENT)
Dept: ORTHOPEDIC SURGERY | Facility: CLINIC | Age: 55
End: 2025-07-09

## 2025-08-11 ENCOUNTER — APPOINTMENT (OUTPATIENT)
Dept: OBGYN | Facility: CLINIC | Age: 55
End: 2025-08-11
Payer: COMMERCIAL

## 2025-08-11 VITALS
WEIGHT: 145 LBS | SYSTOLIC BLOOD PRESSURE: 111 MMHG | HEIGHT: 66.5 IN | BODY MASS INDEX: 23.03 KG/M2 | DIASTOLIC BLOOD PRESSURE: 77 MMHG | HEART RATE: 80 BPM

## 2025-08-11 DIAGNOSIS — Z11.3 ENCOUNTER FOR SCREENING FOR INFECTIONS WITH A PREDOMINANTLY SEXUAL MODE OF TRANSMISSION: ICD-10-CM

## 2025-08-11 DIAGNOSIS — B37.31 ACUTE CANDIDIASIS OF VULVA AND VAGINA: ICD-10-CM

## 2025-08-11 DIAGNOSIS — R39.9 UNSPECIFIED SYMPTOMS AND SIGNS INVOLVING THE GENITOURINARY SYSTEM: ICD-10-CM

## 2025-08-11 PROCEDURE — 99213 OFFICE O/P EST LOW 20 MIN: CPT

## 2025-08-11 RX ORDER — TERCONAZOLE 8 MG/G
0.8 CREAM VAGINAL
Qty: 1 | Refills: 0 | Status: ACTIVE | COMMUNITY
Start: 2025-08-11 | End: 1900-01-01

## 2025-08-14 DIAGNOSIS — N39.0 URINARY TRACT INFECTION, SITE NOT SPECIFIED: ICD-10-CM

## 2025-08-15 LAB
A VAGINAE DNA VAG QL NAA+PROBE: NORMAL
BACTERIA UR CULT: ABNORMAL
BVAB2 DNA VAG QL NAA+PROBE: NORMAL
C KRUSEI DNA VAG QL NAA+PROBE: NEGATIVE
C TRACH RRNA SPEC QL NAA+PROBE: NEGATIVE
CANDIDA DNA VAG QL NAA+PROBE: NEGATIVE
MEGA1 DNA VAG QL NAA+PROBE: NORMAL
N GONORRHOEA RRNA SPEC QL NAA+PROBE: NEGATIVE
T VAGINALIS RRNA SPEC QL NAA+PROBE: NEGATIVE

## 2025-08-15 RX ORDER — NITROFURANTOIN (MONOHYDRATE/MACROCRYSTALS) 25; 75 MG/1; MG/1
100 CAPSULE ORAL
Qty: 14 | Refills: 0 | Status: ACTIVE | COMMUNITY
Start: 2025-08-14 | End: 1900-01-01